# Patient Record
Sex: FEMALE | Race: WHITE | NOT HISPANIC OR LATINO | Employment: FULL TIME | URBAN - METROPOLITAN AREA
[De-identification: names, ages, dates, MRNs, and addresses within clinical notes are randomized per-mention and may not be internally consistent; named-entity substitution may affect disease eponyms.]

---

## 2017-10-23 ENCOUNTER — HOSPITAL ENCOUNTER (OUTPATIENT)
Facility: HOSPITAL | Age: 57
Setting detail: OBSERVATION
Discharge: HOME | End: 2017-10-25
Attending: EMERGENCY MEDICINE | Admitting: INTERNAL MEDICINE
Payer: COMMERCIAL

## 2017-10-23 ENCOUNTER — APPOINTMENT (EMERGENCY)
Dept: RADIOLOGY | Facility: HOSPITAL | Age: 57
End: 2017-10-23
Payer: COMMERCIAL

## 2017-10-23 DIAGNOSIS — R42 VERTIGO: ICD-10-CM

## 2017-10-23 LAB
ALBUMIN SERPL BCP-MCNC: 3.6 G/DL (ref 3.5–5)
ALP SERPL-CCNC: 84 U/L (ref 46–116)
ALT SERPL W P-5'-P-CCNC: 25 U/L (ref 12–78)
ANION GAP SERPL CALCULATED.3IONS-SCNC: 14 MMOL/L (ref 4–13)
AST SERPL W P-5'-P-CCNC: 33 U/L (ref 5–45)
ATRIAL RATE: 70 BPM
BASOPHILS # BLD AUTO: 0.11 THOUSAND/UL (ref 0–0.1)
BASOPHILS NFR MAR MANUAL: 1 % (ref 0–1)
BILIRUB SERPL-MCNC: 0.4 MG/DL (ref 0.2–1)
BUN SERPL-MCNC: 18 MG/DL (ref 5–25)
CALCIUM SERPL-MCNC: 9.3 MG/DL (ref 8.3–10.1)
CHLORIDE SERPL-SCNC: 103 MMOL/L (ref 100–108)
CO2 SERPL-SCNC: 24 MMOL/L (ref 21–32)
CREAT SERPL-MCNC: 0.92 MG/DL (ref 0.6–1.3)
EOSINOPHIL # BLD AUTO: 0.32 THOUSAND/UL (ref 0–0.61)
EOSINOPHIL NFR BLD MANUAL: 3 % (ref 0–6)
ERYTHROCYTE [DISTWIDTH] IN BLOOD BY AUTOMATED COUNT: 13.6 % (ref 11.6–15.1)
ETHANOL SERPL-MCNC: <3 MG/DL (ref 0–3)
GFR SERPL CREATININE-BSD FRML MDRD: 68 ML/MIN/1.73SQ M
GLUCOSE SERPL-MCNC: 131 MG/DL (ref 65–140)
HCT VFR BLD AUTO: 45.2 % (ref 37–47)
HGB BLD-MCNC: 14.7 G/DL (ref 12–16)
HOLD SPECIMEN: NORMAL
LIPASE SERPL-CCNC: 192 U/L (ref 73–393)
LYMPHOCYTES # BLD AUTO: 2.16 THOUSAND/UL (ref 0.6–4.47)
LYMPHOCYTES # BLD AUTO: 20 %
MCH RBC QN AUTO: 27.9 PG (ref 27–31)
MCHC RBC AUTO-ENTMCNC: 32.6 G/DL (ref 31.4–37.4)
MCV RBC AUTO: 86 FL (ref 82–98)
MONOCYTES # BLD AUTO: 0.76 THOUSAND/UL (ref 0–1.22)
MONOCYTES NFR BLD AUTO: 7 % (ref 4–12)
NEUTS BAND NFR BLD MANUAL: 0 % (ref 0–8)
NEUTS SEG # BLD: 7.45 THOUSAND/UL (ref 1.81–6.82)
NEUTS SEG NFR BLD AUTO: 69 %
P AXIS: 53 DEGREES
PLATELET # BLD AUTO: 330 THOUSANDS/UL (ref 130–400)
PLATELET BLD QL SMEAR: ADEQUATE
PMV BLD AUTO: 7.7 FL (ref 8.9–12.7)
POTASSIUM SERPL-SCNC: 4.6 MMOL/L (ref 3.5–5.3)
PR INTERVAL: 160 MS
PROT SERPL-MCNC: 8.1 G/DL (ref 6.4–8.2)
QRS AXIS: -28 DEGREES
QRSD INTERVAL: 106 MS
QT INTERVAL: 438 MS
QTC INTERVAL: 473 MS
RBC # BLD AUTO: 5.27 MILLION/UL (ref 4.2–5.4)
SODIUM SERPL-SCNC: 141 MMOL/L (ref 136–145)
T WAVE AXIS: 4 DEGREES
TOTAL CELLS COUNTED SPEC: 100
VENTRICULAR RATE: 70 BPM
WBC # BLD AUTO: 10.8 THOUSAND/UL (ref 4.8–10.8)

## 2017-10-23 PROCEDURE — 96376 TX/PRO/DX INJ SAME DRUG ADON: CPT

## 2017-10-23 PROCEDURE — 96374 THER/PROPH/DIAG INJ IV PUSH: CPT

## 2017-10-23 PROCEDURE — 87081 CULTURE SCREEN ONLY: CPT | Performed by: INTERNAL MEDICINE

## 2017-10-23 PROCEDURE — 85007 BL SMEAR W/DIFF WBC COUNT: CPT | Performed by: EMERGENCY MEDICINE

## 2017-10-23 PROCEDURE — 93005 ELECTROCARDIOGRAM TRACING: CPT | Performed by: EMERGENCY MEDICINE

## 2017-10-23 PROCEDURE — 36415 COLL VENOUS BLD VENIPUNCTURE: CPT | Performed by: EMERGENCY MEDICINE

## 2017-10-23 PROCEDURE — 83690 ASSAY OF LIPASE: CPT | Performed by: EMERGENCY MEDICINE

## 2017-10-23 PROCEDURE — 80053 COMPREHEN METABOLIC PANEL: CPT | Performed by: EMERGENCY MEDICINE

## 2017-10-23 PROCEDURE — 86617 LYME DISEASE ANTIBODY: CPT | Performed by: EMERGENCY MEDICINE

## 2017-10-23 PROCEDURE — 96375 TX/PRO/DX INJ NEW DRUG ADDON: CPT

## 2017-10-23 PROCEDURE — 99285 EMERGENCY DEPT VISIT HI MDM: CPT

## 2017-10-23 PROCEDURE — 70450 CT HEAD/BRAIN W/O DYE: CPT

## 2017-10-23 PROCEDURE — 96361 HYDRATE IV INFUSION ADD-ON: CPT

## 2017-10-23 PROCEDURE — 80320 DRUG SCREEN QUANTALCOHOLS: CPT | Performed by: EMERGENCY MEDICINE

## 2017-10-23 PROCEDURE — 85027 COMPLETE CBC AUTOMATED: CPT | Performed by: EMERGENCY MEDICINE

## 2017-10-23 RX ORDER — OMEGA-3-ACID ETHYL ESTERS 1 G/1
1 CAPSULE, LIQUID FILLED ORAL DAILY
COMMUNITY
End: 2018-12-05 | Stop reason: ALTCHOICE

## 2017-10-23 RX ORDER — ONDANSETRON 2 MG/ML
4 INJECTION INTRAMUSCULAR; INTRAVENOUS ONCE
Status: COMPLETED | OUTPATIENT
Start: 2017-10-23 | End: 2017-10-23

## 2017-10-23 RX ORDER — ASPIRIN 81 MG/1
81 TABLET ORAL DAILY
Status: DISCONTINUED | OUTPATIENT
Start: 2017-10-23 | End: 2017-10-25 | Stop reason: HOSPADM

## 2017-10-23 RX ORDER — CHLORAL HYDRATE 500 MG
1000 CAPSULE ORAL DAILY
Status: DISCONTINUED | OUTPATIENT
Start: 2017-10-23 | End: 2017-10-25 | Stop reason: HOSPADM

## 2017-10-23 RX ORDER — MECLIZINE HYDROCHLORIDE 25 MG/1
25 TABLET ORAL EVERY 8 HOURS SCHEDULED
Status: DISCONTINUED | OUTPATIENT
Start: 2017-10-23 | End: 2017-10-25 | Stop reason: HOSPADM

## 2017-10-23 RX ORDER — ASPIRIN 81 MG/1
81 TABLET ORAL DAILY
COMMUNITY
End: 2018-12-05 | Stop reason: ALTCHOICE

## 2017-10-23 RX ORDER — METOCLOPRAMIDE HYDROCHLORIDE 5 MG/ML
10 INJECTION INTRAMUSCULAR; INTRAVENOUS ONCE
Status: COMPLETED | OUTPATIENT
Start: 2017-10-23 | End: 2017-10-23

## 2017-10-23 RX ORDER — ONDANSETRON 2 MG/ML
4 INJECTION INTRAMUSCULAR; INTRAVENOUS EVERY 6 HOURS PRN
Status: DISCONTINUED | OUTPATIENT
Start: 2017-10-23 | End: 2017-10-25 | Stop reason: HOSPADM

## 2017-10-23 RX ORDER — ACETAMINOPHEN 325 MG/1
650 TABLET ORAL EVERY 6 HOURS PRN
Status: DISCONTINUED | OUTPATIENT
Start: 2017-10-23 | End: 2017-10-25 | Stop reason: HOSPADM

## 2017-10-23 RX ORDER — MECLIZINE HYDROCHLORIDE 25 MG/1
50 TABLET ORAL ONCE
Status: COMPLETED | OUTPATIENT
Start: 2017-10-23 | End: 2017-10-23

## 2017-10-23 RX ORDER — DIAZEPAM 5 MG/ML
2.5 INJECTION, SOLUTION INTRAMUSCULAR; INTRAVENOUS ONCE
Status: COMPLETED | OUTPATIENT
Start: 2017-10-23 | End: 2017-10-23

## 2017-10-23 RX ADMIN — SODIUM CHLORIDE 1000 ML: 0.9 INJECTION, SOLUTION INTRAVENOUS at 06:19

## 2017-10-23 RX ADMIN — MECLIZINE HYDROCHLORIDE 25 MG: 25 TABLET ORAL at 14:03

## 2017-10-23 RX ADMIN — ONDANSETRON 4 MG: 2 INJECTION INTRAMUSCULAR; INTRAVENOUS at 06:18

## 2017-10-23 RX ADMIN — METOCLOPRAMIDE 10 MG: 5 INJECTION, SOLUTION INTRAMUSCULAR; INTRAVENOUS at 08:44

## 2017-10-23 RX ADMIN — ONDANSETRON 4 MG: 2 INJECTION INTRAMUSCULAR; INTRAVENOUS at 08:05

## 2017-10-23 RX ADMIN — ACETAMINOPHEN 650 MG: 325 TABLET, FILM COATED ORAL at 22:37

## 2017-10-23 RX ADMIN — MECLIZINE HYDROCHLORIDE 50 MG: 25 TABLET ORAL at 08:38

## 2017-10-23 RX ADMIN — MECLIZINE HYDROCHLORIDE 25 MG: 25 TABLET ORAL at 21:55

## 2017-10-23 RX ADMIN — Medication 1000 MG: at 10:41

## 2017-10-23 RX ADMIN — ASPIRIN 81 MG: 81 TABLET, COATED ORAL at 10:41

## 2017-10-23 RX ADMIN — DIAZEPAM 2.5 MG: 5 INJECTION, SOLUTION INTRAMUSCULAR; INTRAVENOUS at 07:15

## 2017-10-23 RX ADMIN — ONDANSETRON 4 MG: 2 INJECTION INTRAMUSCULAR; INTRAVENOUS at 10:42

## 2017-10-23 NOTE — ED PROVIDER NOTES
History  Chief Complaint   Patient presents with    Dizziness     patient woke up this 0400 with dizziness and nausea, patient states can't lay down, did have one episode of vomiting this am, describes as room spinning this am    Nausea     78-year-old white female woke up this morning at 4 o'clock with vertigo  Patient unable to move head from side sided change positions without getting extremely nauseous and throwing up  No prior history of same  No recent URI, no recent tick bite, no recent travel at altitude, no hearing loss, no improvement with p o  Intake  No recent trauma  Dizziness   Quality:  Room spinning  Severity:  Severe  Onset quality:  Sudden  Duration:  2 hours  Timing:  Constant  Progression:  Worsening  Chronicity:  New  Context: bending over, head movement and standing up    Relieved by:  Nothing  Worsened by: Movement and turning head  Ineffective treatments:  Lying down, fluids, being still and closing eyes  Associated symptoms: nausea, vision changes and vomiting    Associated symptoms: no blood in stool, no chest pain, no diarrhea, no headaches, no hearing loss, no palpitations, no shortness of breath, no syncope, no tinnitus and no weakness    Risk factors: no hx of stroke, no hx of vertigo, no Meniere's disease and no new medications        Prior to Admission Medications   Prescriptions Last Dose Informant Patient Reported? Taking?   aspirin (ECOTRIN LOW STRENGTH) 81 mg EC tablet   Yes Yes   Sig: Take 81 mg by mouth daily   omega-3-acid ethyl esters (LOVAZA) 1 g capsule   Yes Yes   Sig: Take 2 g by mouth 2 (two) times a day      Facility-Administered Medications: None       Past Medical History:   Diagnosis Date    Blood clot in vein     in legs after fall       Past Surgical History:   Procedure Laterality Date    VEIN SURGERY         History reviewed  No pertinent family history  I have reviewed and agree with the history as documented      Social History   Substance Use Topics    Smoking status: Never Smoker    Smokeless tobacco: Never Used    Alcohol use Yes      Comment: rare        Review of Systems   Constitutional: Negative for chills and fever  HENT: Positive for congestion  Negative for hearing loss and tinnitus  Eyes: Positive for visual disturbance  Respiratory: Negative for shortness of breath  Cardiovascular: Negative for chest pain, palpitations and syncope  Gastrointestinal: Positive for nausea and vomiting  Negative for blood in stool and diarrhea  Genitourinary: Negative  Musculoskeletal: Negative  Negative for back pain  Skin: Negative  Neurological: Positive for dizziness  Negative for syncope, speech difficulty, weakness, numbness and headaches  Hematological: Negative  Psychiatric/Behavioral: Negative  All other systems reviewed and are negative  Physical Exam  ED Triage Vitals [10/23/17 0603]   Temperature Pulse Respirations Blood Pressure SpO2   (!) 95 9 °F (35 5 °C) 80 20 132/59 98 %      Temp Source Heart Rate Source Patient Position - Orthostatic VS BP Location FiO2 (%)   Tympanic Monitor Lying Right arm --      Pain Score       No Pain           Physical Exam   Constitutional: She is oriented to person, place, and time  She appears well-developed and well-nourished  HENT:   Head: Normocephalic and atraumatic  Right Ear: External ear normal    Left Ear: External ear normal    Nose: Nose normal    Mouth/Throat: Oropharynx is clear and moist    Eyes: Conjunctivae and lids are normal  Pupils are equal, round, and reactive to light  Lids are everted and swept, no foreign bodies found  Right eye exhibits nystagmus  Left eye exhibits nystagmus  Neck: Normal range of motion  Neck supple  Cardiovascular: Normal rate, regular rhythm, normal heart sounds and intact distal pulses  Pulmonary/Chest: Effort normal and breath sounds normal    Abdominal: Soft   Bowel sounds are normal    Musculoskeletal: Normal range of motion  Neurological: She is alert and oriented to person, place, and time  Skin: Skin is warm and dry  Capillary refill takes less than 2 seconds  Psychiatric: She has a normal mood and affect  Her behavior is normal  Judgment and thought content normal    Nursing note and vitals reviewed  ED Medications  Medications   sodium chloride 0 9 % bolus 1,000 mL (1,000 mL Intravenous New Bag 10/23/17 0619)   diazepam (VALIUM) injection 2 5 mg (not administered)   ondansetron (ZOFRAN) injection 4 mg (4 mg Intravenous Given 10/23/17 0618)       Diagnostic Studies  Labs Reviewed   CBC AND DIFFERENTIAL - Abnormal        Result Value Ref Range Status    MPV 7 7 (*) 8 9 - 12 7 fL Final    WBC 10 80  4 80 - 10 80 Thousand/uL Final    RBC 5 27  4 20 - 5 40 Million/uL Final    Hemoglobin 14 7  12 0 - 16 0 g/dL Final    Hematocrit 45 2  37 0 - 47 0 % Final    MCV 86  82 - 98 fL Final    MCH 27 9  27 0 - 31 0 pg Final    MCHC 32 6  31 4 - 37 4 g/dL Final    RDW 13 6  11 6 - 15 1 % Final    Platelets 251  645 - 400 Thousands/uL Final   COMPREHENSIVE METABOLIC PANEL - Abnormal     Anion Gap 14 (*) 4 - 13 mmol/L Final    Sodium 141  136 - 145 mmol/L Final    Potassium 4 6  3 5 - 5 3 mmol/L Final    Comment: Slightly Hemolyzed; Results May be Affected    Chloride 103  100 - 108 mmol/L Final    CO2 24  21 - 32 mmol/L Final    BUN 18  5 - 25 mg/dL Final    Creatinine 0 92  0 60 - 1 30 mg/dL Final    Comment: Standardized to IDMS reference method    Glucose 131  65 - 140 mg/dL Final    Comment:   If the patient is fasting, the ADA then defines impaired fasting glucose as > 100 mg/dL and diabetes as > or equal to 123 mg/dL  Specimen collection should occur prior to Sulfasalazine administration due to the potential for falsely depressed results  Specimen collection should occur prior to Sulfapyridine administration due to the potential for falsely elevated results      Calcium 9 3  8 3 - 10 1 mg/dL Final    AST 33  5 - 45 U/L Final    Comment: Slightly Hemolyzed; Results May be Affected  Specimen collection should occur prior to Sulfasalazine administration due to the potential for falsely depressed results  ALT 25  12 - 78 U/L Final    Comment:   Specimen collection should occur prior to Sulfasalazine administration due to the potential for falsely depressed results  Alkaline Phosphatase 84  46 - 116 U/L Final    Total Protein 8 1  6 4 - 8 2 g/dL Final    Albumin 3 6  3 5 - 5 0 g/dL Final    Total Bilirubin 0 40  0 20 - 1 00 mg/dL Final    eGFR 68  ml/min/1 73sq m Final    Narrative:     National Kidney Disease Education Program recommendations are as follows:  GFR calculation is accurate only with a steady state creatinine  Chronic Kidney disease less than 60 ml/min/1 73 sq  meters  Kidney failure less than 15 ml/min/1 73 sq  meters     MANUAL DIFF (NON WAM) - Abnormal     Neutrophils Absolute 7 45 (*) 1 81 - 6 82 Thousand/uL Final    Basophils Absolute 0 11 (*) 0 00 - 0 10 Thousand/uL Final    Segmented % 69  % Final    Bands % 0  0 - 8 % Final    Lymphocytes % 20  % Final    Monocytes % 7  4 - 12 % Final    Eosinophils % 3  0 - 6 % Final    Basophils % 1  0 - 1 % Final    Lymphocytes Absolute 2 16  0 60 - 4 47 Thousand/uL Final    Monocytes Absolute 0 76  0 00 - 1 22 Thousand/uL Final    Eosinophils Absolute 0 32  0 00 - 0 61 Thousand/uL Final    Total Counted 100   Final    Platelet Estimate Adequate  Adequate Final   LIPASE - Normal    Lipase 192  73 - 393 u/L Final   MEDICAL ALCOHOL   LYME DISEASE, WESTERN BLOT   LIGHT BLUE TOP   GREEN / BLACK TUBE ON HOLD       CT head without contrast    (Results Pending)       Procedures  ECG 12 Lead Documentation  Date/Time: 10/23/2017 6:20 AM  Performed by: Roseann Vitale  Authorized by: Endy VILLA     Indications / Diagnosis:  70  ECG reviewed by me, the ED Provider: yes    Patient location:  ED  Interpretation:     Interpretation: normal    Rate:     ECG rate:  70    ECG rate assessment: normal    Rhythm:     Rhythm: sinus rhythm    Ectopy:     Ectopy: none    QRS:     QRS axis:  Normal    QRS intervals:  Normal  Conduction:     Conduction: normal    ST segments:     ST segments:  Normal  T waves:     T waves: normal            Phone Contacts  ED Phone Contact    ED Course  ED Course                                [facility]  CritCare Time    Disposition  Final diagnoses:   Vertigo     ED Disposition     None      Follow-up Information     Follow up With Specialties Details Why Contact Info    [employer]  In 1 week  [phone]          Patient's Medications   Discharge Prescriptions    No medications on file     No discharge procedures on file      ED Provider  Electronically Signed by       Bill Kay MD  10/23/17 8128

## 2017-10-23 NOTE — ED CARE HANDOFF
8:35 AM  This is a 38-year-old female with noted new onset of severe vertigo  The patient had a CT scan as well as blood work performed in the emergency department centrally unremarkable  She is unrelenting in her symptoms and did not improved with medications  Plan will be for inpatient management and evaluation    Spoke with hospitalist agree for inpatient management and eval

## 2017-10-23 NOTE — ED NOTES
Attempted to sit pt up, but instantly became dizzy and did not tolerate it  Dr Brittanie Murguia made aware       Janessa Ayala, TL  10/23/17 8749       Janessa Ayala RN  10/23/17 7566

## 2017-10-23 NOTE — DISCHARGE INSTRUCTIONS
Dizziness, Ambulatory Care   GENERAL INFORMATION:   Dizziness  is a term used to describe a feeling of being off balance or unsteady  Common causes of dizziness are an inner ear fluid imbalance or a lack of oxygen in your blood  Your dizziness may be acute (lasts 3 days or less) or chronic (lasts longer than 3 days)  You may have dizzy spells that last from seconds to a few hours  Common symptoms related to dizziness include the following:   · A feeling that your surroundings are moving even though you are standing still    · Ringing in your ears or hearing loss     · Feeling faint or lightheaded     · Weakness or unsteadiness     · Double vision or eye movements you cannot control    · Nausea or vomiting     · Confusion  Seek immediate care for the following symptoms:   · You have a headache that does not go away with medicine  · You have shaking chills and a fever  · You vomit over and over with no relief  · Your vomit or bowel movements are red or black  · You have pain in your chest, back, or abdomen  · You have numbness, especially in your face, arms, or legs  · You have trouble moving your arms or legs  Treatment for dizziness  depends on the cause of your dizziness  You may need medicines to decrease your dizziness or nausea  You may need to be admitted to the hospital for treatment  Manage your symptoms:  Get up slowly from sitting or lying down  Do not drive or operate machinery when you are dizzy  Follow up with your healthcare provider as directed:  Write down your questions so you remember to ask them during your visits  CARE AGREEMENT:   You have the right to help plan your care  Learn about your health condition and how it may be treated  Discuss treatment options with your caregivers to decide what care you want to receive  You always have the right to refuse treatment  The above information is an  only   It is not intended as medical advice for individual conditions or treatments  Talk to your doctor, nurse or pharmacist before following any medical regimen to see if it is safe and effective for you  © 2014 [address] is for End User's use only and may not be sold, redistributed or otherwise used for commercial purposes  All illustrations and images included in CareNotes® are the copyrighted property of [employer]  or [employer]  [facility], [facility] Care   GENERAL INFORMATION:   Vertigo  is when you feel dizzy  You may think that you or your surroundings are spinning even though you are not moving  Vertigo may be caused by problems that affect your inner ear or the part of your brain that controls balance  Common symptoms include the following: You may feel that you or everything around you is moving or spinning  You may also feel like you are being pulled down or toward your side  Symptoms may occur after you change positions, such as when you turn over in bed or move your head or neck  You may have nausea or vomiting  You may also have trouble with your balance, which may cause you to fall  Seek immediate care for the following symptoms:   · Blood, pus, or fluid is coming out of your ears  · Any of the following signs of a stroke:     ¨ Numbness or drooping on one side of your face     ¨ Weakness in an arm or leg    ¨ Confusion or difficulty speaking    ¨ Dizziness, a severe headache, or vision loss  Treatment for vertigo  depends on the cause  You may need medicines to treat your vertigo  You may also need medicine to treat symptoms caused by vertigo, such as nausea or headaches  You may need surgery to treat the cause of your vertigo  Manage your symptoms:   · Do not walk without help, drive a car, or operate heavy machinery if you feel dizzy  · Move slowly and let yourself get used to one position before you move to another position  · Sit or lie down right away if you feel dizzy    Balance therapy: You may need to see a therapist to teach you certain exercises  These exercises will help your brain adapt to the dizziness  This will help decrease your vertigo and improve your balance  Ask for more information about this therapy  Follow up with your healthcare provider as directed:  Write down your questions so you remember to ask them during your visits  CARE AGREEMENT:   You have the right to help plan your care  Learn about your health condition and how it may be treated  Discuss treatment options with your caregivers to decide what care you want to receive  You always have the right to refuse treatment  The above information is an  only  It is not intended as medical advice for individual conditions or treatments  Talk to your doctor, nurse or pharmacist before following any medical regimen to see if it is safe and effective for you  © 2014 [address] is for End User's use only and may not be sold, redistributed or otherwise used for commercial purposes  All illustrations and images included in CareNotes® are the copyrighted property of [employer] Inc  or [employer]

## 2017-10-23 NOTE — H&P
History and Physical - [facility] Internal Medicine    Patient Information: Jero Colby 59 y o  female MRN: [id number]  Unit/Bed#: [address] [address] Encounter: [id number]  Admitting Physician: Danielle Walls MD  PCP: No primary care provider on file  Date of Admission:  10/23/17        Hospital Problem List:     Principal Problem:    Vertigo      Assessment/Plan:      1  Vertigo-likely benign positional vertigo  The patient will be observation over night  Symptomatic treatment with Zofran and meclizine 25 milligram p o  t i d   CT scan of the brain was normal   If patient does not improve over the next 24 hours we will consider Neurology evaluation  2  Dyslipidemia-continue Lovaza  3  Varicose veins status post vein stripping    VTE Prophylaxis: Low risk  / sequential compression device   Code Status: No Order    Anticipated Length of Stay:  Patient will be admitted on an Observation basis with an anticipated length of stay of  Less than 2 midnights  Justification for Hospital Stay: Vertigo    Total Time for Visit, including Counseling / Coordination of Care: 1 hour  Greater than 50% of this total time spent on direct patient counseling and coordination of care  Chief Complaint:     Dizziness (patient woke up this 0400 with dizziness and nausea, patient states can't lay down, did have one episode of vomiting this am, describes as room spinning this am) and Nausea      History of Present Illness:    Jero Colby is a 59 y o  female with past medical history of dyslipidemia present to the emergency room complaining of spinning sensation of lightheadedness with nausea and vomiting since this morning  Patient reports that she woke up this morning and started feeling the room spinning sensation followed by nausea  Patient had 3 episodes of vomiting since this morning  Patient denies any earache, headache, chest pain, palpitations, shortness of breath, abdominal pain   Patient reports the spinning sensation is worse with movement of the head and changing positions  Patient denies any weakness, tingling numbness any part of her body  In the ED, patient's vital signs were stable and CT scan of the brain was negative  Patient received diazepam, meclizine, Reglan and Zofran with continued symptoms  Review of Systems:    Review of Systems   Constitutional: Negative for activity change, appetite change, chills, diaphoresis, fatigue, fever and unexpected weight change  HENT: Negative for congestion, ear discharge, ear pain, facial swelling, hearing loss, mouth sores, nosebleeds, postnasal drip, rhinorrhea, sinus pressure, sneezing, sore throat, tinnitus, trouble swallowing and voice change  Eyes: Negative for photophobia, discharge, redness and visual disturbance  Respiratory: Negative for cough, chest tightness, shortness of breath, wheezing and stridor  Cardiovascular: Negative for chest pain, palpitations and leg swelling  Gastrointestinal: Positive for nausea and vomiting  Negative for abdominal distention, abdominal pain, anal bleeding, blood in stool, constipation and diarrhea  Endocrine: Negative for polydipsia, polyphagia and polyuria  Genitourinary: Negative for decreased urine volume, difficulty urinating, dysuria, flank pain, hematuria, menstrual problem, pelvic pain, urgency, vaginal bleeding and vaginal discharge  Musculoskeletal: Negative for arthralgias, back pain and neck stiffness  Skin: Negative for pallor and rash  Neurological: Positive for dizziness  Negative for seizures, facial asymmetry, speech difficulty, light-headedness, numbness and headaches  Spinning sensation   Hematological: Negative for adenopathy  Psychiatric/Behavioral: Negative for agitation and confusion         Past Medical and Surgical History:     Past Medical History:   Diagnosis Date    Varicose vein of leg        Past Surgical History:   Procedure Laterality Date    VEIN SURGERY vein stripping       Meds/Allergies:    PTA meds:   Prior to Admission Medications   Prescriptions Last Dose Informant Patient Reported? Taking?   aspirin (ECOTRIN LOW STRENGTH) 81 mg EC tablet 10/23/2017 at Unknown time  Yes Yes   Sig: Take 81 mg by mouth daily   omega-3-acid ethyl esters (LOVAZA) 1 g capsule 10/22/2017 at Unknown time  Yes Yes   Sig: Take 2 g by mouth 2 (two) times a day      Facility-Administered Medications: None       Allergies: Allergies   Allergen Reactions    Augmentin [Amoxicillin-Pot Clavulanate] GI Intolerance    Penicillins Rash       Social History:     Marital Status: /Civil Union     Substance Use History:   History   Alcohol Use    Yes     Comment: rare     History   Smoking Status    Never Smoker   Smokeless Tobacco    Never Used     History   Drug use: Unknown       Family History:    Family History   Problem Relation Age of Onset    Heart attack Mother     Transient ischemic attack Mother     Diabetes Father        Physical Exam:     Vitals:   Blood Pressure: 122/61 (10/23/17 0937)  Pulse: 78 (10/23/17 0937)  Temperature: (!) 96 9 °F (36 1 °C) (10/23/17 0937)  Temp Source: Tympanic (10/23/17 0937)  Respirations: 18 (10/23/17 0937)  Height: 5' 7 5" (171 5 cm) (10/23/17 8389)  Weight - Scale: 81 kg (178 lb 9 2 oz) (10/23/17 0937)  SpO2: 98 % (10/23/17 0937)    Physical Exam   Constitutional: No distress  HENT:   Head: Normocephalic and atraumatic  Nose: Nose normal    Positive nystagmus   Eyes: Conjunctivae and EOM are normal  Pupils are equal, round, and reactive to light  Neck: Normal range of motion  Neck supple  No JVD present  Cardiovascular: Normal rate, regular rhythm and normal heart sounds  Exam reveals no gallop and no friction rub  No murmur heard  Pulmonary/Chest: Effort normal and breath sounds normal  No respiratory distress  She has no wheezes  She has no rales  She exhibits no tenderness  Abdominal: Soft   Bowel sounds are normal  She exhibits no distension  There is no tenderness  There is no rebound and no guarding  Musculoskeletal: She exhibits no edema  Neurological: She is alert  No cranial nerve deficit  Skin: Skin is warm and dry  No rash noted  Psychiatric: She has a normal mood and affect  Lab Results: I have personally reviewed pertinent films in PACS      Results from last 7 days  Lab Units 10/23/17  0619   WBC Thousand/uL 10 80   HEMOGLOBIN g/dL 14 7   HEMATOCRIT % 45 2   PLATELETS Thousands/uL 330   LYMPHO PCT % 20   MONO PCT MAN % 7   EOSINO PCT MANUAL % 3       Results from last 7 days  Lab Units 10/23/17  0619   SODIUM mmol/L 141   POTASSIUM mmol/L 4 6   CHLORIDE mmol/L 103   CO2 mmol/L 24   BUN mg/dL 18   CREATININE mg/dL 0 92   CALCIUM mg/dL 9 3   TOTAL PROTEIN g/dL 8 1   BILIRUBIN TOTAL mg/dL 0 40   ALK PHOS U/L 84   ALT U/L 25   AST U/L 33   GLUCOSE RANDOM mg/dL 131           Imaging: I have personally reviewed pertinent films in PACS    Ct Head Without Contrast    Result Date: 10/23/2017  Narrative: CT BRAIN - WITHOUT CONTRAST INDICATION:  Vertigo  COMPARISON:  None  TECHNIQUE:  CT examination of the brain was performed  In addition to axial images, coronal reformatted images were created and submitted for interpretation  Radiation dose length product (DLP) for this visit:  1060 68 mGy-cm   This examination, like all CT scans performed in the [facility], was performed utilizing techniques to minimize radiation dose exposure, including the use of iterative reconstruction and automated exposure control  IMAGE QUALITY:  Diagnostic  FINDINGS:  PARENCHYMA:  No intracranial mass, mass effect or midline shift  No CT signs of acute infarction  There is no parenchymal hemorrhage  VENTRICLES AND EXTRA-AXIAL SPACES:  Normal for patient's age  VISUALIZED ORBITS AND PARANASAL SINUSES:  Unremarkable   CALVARIUM AND EXTRACRANIAL SOFT TISSUES:   Normal      Impression: No acute intracranial abnormality  Workstation performed: [id number]       CT head without contrast   Final Result      No acute intracranial abnormality  Workstation performed: [id number]             EKG, Pathology, and Other Studies Reviewed on Admission:   · EKG shows normal sinus rhythm without any acute ST-T changes  Allscripts Records Reviewed: Yes     ** Please Note: Dragon 360 Dictation voice to text software may have been used in the creation of this document   **

## 2017-10-24 LAB — MRSA NOSE QL CULT: NORMAL

## 2017-10-24 RX ADMIN — MECLIZINE HYDROCHLORIDE 25 MG: 25 TABLET ORAL at 06:14

## 2017-10-24 RX ADMIN — Medication 1000 MG: at 08:45

## 2017-10-24 RX ADMIN — ACETAMINOPHEN 650 MG: 325 TABLET, FILM COATED ORAL at 08:45

## 2017-10-24 RX ADMIN — MECLIZINE HYDROCHLORIDE 25 MG: 25 TABLET ORAL at 15:01

## 2017-10-24 RX ADMIN — MECLIZINE HYDROCHLORIDE 25 MG: 25 TABLET ORAL at 21:19

## 2017-10-24 RX ADMIN — ASPIRIN 81 MG: 81 TABLET, COATED ORAL at 08:45

## 2017-10-24 NOTE — CASE MANAGEMENT
Initial Clinical Review    Admission: Date/Time/Statement: 10/23/17 0602    Orders Placed This Encounter   Procedures    Place in Observation (expected length of stay for this patient is less than two midnights)     Standing Status:   Standing     Number of Occurrences:   1     Order Specific Question:   Admitting Physician     Answer:   Margarito Mckeon     Order Specific Question:   Level of Care     Answer:   Med Surg [16]         ED: Date/Time/Mode of Arrival:   ED Arrival Information     Expected Arrival Acuity Means of Arrival Escorted By Service Admission Type    - 10/23/2017 05:54 Urgent Wheelchair Family Member General Medicine Urgent    Arrival Complaint    NAUSEA, DIZZINESS          Chief Complaint:   Chief Complaint   Patient presents with    Dizziness     patient woke up this 0400 with dizziness and nausea, patient states can't lay down, did have one episode of vomiting this am, describes as room spinning this am    Nausea       History of Illness: 59 y o  female with past medical history of dyslipidemia present to the emergency room complaining of spinning sensation of lightheadedness with nausea and vomiting since this morning  Patient reports that she woke up this morning and started feeling the room spinning sensation followed by nausea  Patient had 3 episodes of vomiting since this morning  Patient denies any earache, headache, chest pain, palpitations, shortness of breath, abdominal pain  Patient reports the spinning sensation is worse with movement of the head and changing positions  Patient denies any weakness, tingling numbness any part of her body  In the ED, patient's vital signs were stable and CT scan of the brain was negative  Patient received diazepam, meclizine, Reglan and Zofran with continued symptoms      ED Vital Signs:   ED Triage Vitals [10/23/17 0603]   Temperature Pulse Respirations Blood Pressure SpO2   (!) 95 9 °F (35 5 °C) 80 20 132/59 98 %      Temp Source Heart Rate Source Patient Position - Orthostatic VS BP Location FiO2 (%)   Tympanic Monitor Lying Right arm --      Pain Score       No Pain        Wt Readings from Last 1 Encounters:   10/23/17 81 kg (178 lb 9 2 oz)       Vital Signs (abnormal): Abnormal Labs/Diagnostic Test Results:   Ct head  No acute intracranial abnormality  ED Treatment:   Medication Administration from 10/23/2017 0554 to 10/23/2017 0914       Date/Time Order Dose Route Action Action by Comments     10/23/2017 0618 ondansetron (ZOFRAN) injection 4 mg 4 mg Intravenous Given Jeff Godinez RN      10/23/2017 9783 sodium chloride 0 9 % bolus 1,000 mL 0 mL Intravenous Stopped Janessa Ayala RN      10/23/2017 7313 sodium chloride 0 9 % bolus 1,000 mL 1,000 mL Intravenous Rikaet 37 Jeff Godinez RN      10/23/2017 0715 diazepam (VALIUM) injection 2 5 mg 2 5 mg Intravenous Given Kylee Canales RN      10/23/2017 0805 ondansetron (ZOFRAN) injection 4 mg 4 mg Intravenous Given Janessa Ayala RN      10/23/2017 9843 meclizine (ANTIVERT) tablet 50 mg 50 mg Oral Given Janessa Ayala RN      10/23/2017 0844 metoclopramide (REGLAN) injection 10 mg 10 mg Intravenous Given Janessa Ayala RN           Past Medical/Surgical History: Active Ambulatory Problems     Diagnosis Date Noted    No Active Ambulatory Problems     Resolved Ambulatory Problems     Diagnosis Date Noted    No Resolved Ambulatory Problems     Past Medical History:   Diagnosis Date    Varicose vein of leg        Admitting Diagnosis: Dizziness [R42]  Nausea [R11 0]  Vertigo [R42]    Age/Sex: 59 y o  female    Assessment/Plan:   Principal Problem:    Vertigo  Assessment/Plan:  1  Vertigo-likely benign positional vertigo  The patient will be observation over night    Symptomatic treatment with Zofran and meclizine 25 milligram p o  t i d   CT scan of the brain was normal   If patient does not improve over the next 24 hours we will consider Neurology evaluation  2  Dyslipidemia-continue Lovaza  3  Varicose veins status post vein stripping  VTE Prophylaxis: Low risk  / sequential compression device   Code Status: No Order  Anticipated Length of Stay:  Patient will be admitted on an Observation basis with an anticipated length of stay of  Less than 2 midnights     Justification for Hospital Stay: Vertigo    Admission Orders:  Tele mon  Scheduled Meds:   aspirin 81 mg Oral Daily   fish oil 1,000 mg Oral Daily   meclizine 25 mg Oral Q8H [facility]     Continuous Infusions:    PRN Meds:   acetaminophen    ondansetron

## 2017-10-24 NOTE — SOCIAL WORK
Met with pt  Resides with  Kerry Geiger in their home  Uses no dme  NO hhc  Has been independent, drives, and is employed  Explained role of cm, no d/c needs anticipated  CM reviewed d/c planning process including the following: identifying help at home, patient preference for d/c planning needs, and availability of the treatment team to discuss questions or concerns patient and/or family may have regarding understanding of medications and recognizing signs and symptoms once discharged  CM also encouraged patient to follow up with all recommended appointments after discharge  Patient advised of importance for patient and family to participate in managing patient's medical well being

## 2017-10-24 NOTE — PLAN OF CARE
Problem: PAIN - ADULT  Goal: Verbalizes/displays adequate comfort level or baseline comfort level  Interventions:  - Encourage patient to monitor pain and request assistance  - Assess pain using appropriate pain scale  - Administer analgesics based on type and severity of pain and evaluate response  - Implement non-pharmacological measures as appropriate and evaluate response  - Consider cultural and social influences on pain and pain management  - Notify physician/advanced practitioner if interventions unsuccessful or patient reports new pain   Outcome: Progressing      Problem: SAFETY ADULT  Goal: Patient will remain free of falls  INTERVENTIONS:  - Assess patient frequently for physical needs  -  Identify  physical deficits and behaviors that affect risk of falls    -  [place] fall precautions as indicated by assessment   - Educate patient/family on patient safety including physical limitations  - Instruct patient to call for assistance with activity based on assessment  - Modify environment to reduce risk of injury  - Consider OT/PT consult to assist with strengthening/mobility    Outcome: Progressing    Goal: Maintain or return to baseline ADL function  INTERVENTIONS:  -  Assess patient's ability to carry out ADLs; assess patient's baseline for ADL function and identify physical deficits which impact ability to perform ADLs (bathing, care of mouth/teeth, toileting, grooming, dressing, etc )  - Assess/evaluate cause of self-care deficits   - Assess range of motion  - Assess patient's mobility; develop plan if impaired  - Assess patient's need for assistive devices and provide as appropriate  - Encourage maximum independence but intervene and supervise when necessary  ¯ Involve family in performance of ADLs  ¯ Assess for home care needs following discharge   ¯ Request OT consult to assist with ADL evaluation and planning for discharge  ¯ Provide patient education as appropriate   Outcome: Progressing    Goal: Maintain or return mobility status to optimal level  INTERVENTIONS:  - Assess patient's baseline mobility status (ambulation, transfers, stairs, etc )    - Identify  physical deficits and behaviors that affect mobility  - [place] fall precautions as indicated by assessment  - Record patient progress and toleration of activity level on Mobility SBAR; progress patient to next Phase/Stage  - Instruct patient to call for assistance with activity based on assessment     Outcome: Progressing      Problem: DISCHARGE PLANNING  Goal: Discharge to home or other facility with appropriate resources  INTERVENTIONS:  - Identify barriers to discharge w/patient and caregiver  - Arrange for needed discharge resources and transportation as appropriate  - Identify discharge learning needs (meds, wound care, etc )  - Arrange for interpretive services to assist at discharge as needed  - Refer to Case Management Department for coordinating discharge planning if the patient needs post-hospital services based on physician/advanced practitioner order or complex needs related to functional status, cognitive ability, or social support system   Outcome: Progressing      Problem: Knowledge Deficit  Goal: Patient/family/caregiver demonstrates understanding of disease process, treatment plan, medications, and discharge instructions  Complete learning assessment and assess knowledge base  Interventions:  - Provide teaching at level of understanding  - Provide teaching via preferred learning methods   Outcome: Progressing      Problem: NEUROSENSORY - ADULT  Goal: Achieves stable or improved neurological status  INTERVENTIONS  - Monitor and report changes in neurological status  - Monitor temperature, glucose, and sodium or any other associated labs   Initiate appropriate interventions as ordered   - Administer anti-vertigo medication as ordered   Outcome: Progressing      Problem: GASTROINTESTINAL - ADULT  Goal: Minimal or absence of nausea and/or vomiting  INTERVENTIONS:  - Administer IV fluids as ordered to ensure adequate hydration  - Maintain NPO status until nausea and vomiting are resolved  - Administer ordered antiemetic medications as needed  - Provide nonpharmacologic comfort measures as appropriate  - Advance diet as tolerated, if ordered     Outcome: Progressing

## 2017-10-24 NOTE — PLAN OF CARE
Problem: DISCHARGE PLANNING - CARE MANAGEMENT  Goal: Discharge to post-acute care or home with appropriate resources  INTERVENTIONS:  - Conduct assessment to determine patient/family and health care team treatment goals, and need for post-acute services based on payer coverage, community resources, and patient preferences, and barriers to discharge  - Address psychosocial, clinical, and financial barriers to discharge as identified in assessment in conjunction with the patient/family and health care team  - Arrange appropriate level of post-acute services according to patient's   needs and preference and payer coverage in collaboration with the physician and health care team  - Communicate with and update the patient/family, physician, and health care team regarding progress on the discharge plan  - Arrange appropriate transportation to post-acute venues  Outcome: Adequate for Discharge  Plan:  Discharge to home with no needs

## 2017-10-24 NOTE — CONSULTS
[facility]   Neurology Initial Consult    Boogie Ames is a 59 y o  female  [facility]          Information obtained from:   Chief Complaint   Patient presents with    Dizziness     patient woke up this 0400 with dizziness and nausea, patient states can't lay down, did have one episode of vomiting this am, describes as room spinning this am    Nausea         Assessment/Plan:    1  Benign positional vertigo  2  HLD    Patient's clinical history and exam are suggestive of peripheral etiology of her vertigo  Discussed few exercises and movements to avoid  Cont prn meclizine and provide script upon discharge  Refer her to balance center upon discharge for canalith repositioning  Discussed prognosis  HPI:  Boogie Ames is a 58 yo F with no significant PMH presents with dizziness  She states that yesterday morning she woke up and had sudden onset of room spinning sensation  She became nauseated later and vomited  She said movement, sitting up, turning head would make her very dizzy and nauseous  She denies associated headache, tinnitus  She does admit that prior to the onset yesterday morning, she was sleeping with her head tilted back on pillow  She thinks she's noticed left ear hearing impairment for past few months  Denies recent infection, sinus disease  Denies difficulty with balance, paresthesia or weakness  Denies associated vision or speech disturbance  She denies similar episode in past  She has been getting meclizine and valium prn and says it has been helping  Today she feels much better compared to yesterday            Past Medical History:   Diagnosis Date    Varicose vein of leg        Past Surgical History:   Procedure Laterality Date    VEIN SURGERY      vein stripping       Allergies   Allergen Reactions    Augmentin [Amoxicillin-Pot Clavulanate] GI Intolerance    Penicillins Rash         Current Facility-Administered Medications:    acetaminophen (TYLENOL) tablet 650 mg, 650 mg, Oral, Q6H PRN, Oh Cook MD, 650 mg at 10/24/17 0845    aspirin (ECOTRIN LOW STRENGTH) EC tablet 81 mg, 81 mg, Oral, Daily, Arley Rosas MD, 81 mg at 10/24/17 0845    fish oil capsule 1,000 mg, 1,000 mg, Oral, Daily, Oh Cook MD, 1,000 mg at 10/24/17 0845    meclizine (ANTIVERT) tablet 25 mg, 25 mg, Oral, Q8H [address], Acevedo Race MD Alison, 25 mg at 10/24/17 1501    ondansetron (ZOFRAN) injection 4 mg, 4 mg, Intravenous, Q6H PRN, Oh Cook MD, 4 mg at 10/23/17 1042    Social History     Social History    Marital status: /Civil Union     Spouse name: N/A    Number of children: N/A    Years of education: N/A     Occupational History    Not on file  Social History Main Topics    Smoking status: Never Smoker    Smokeless tobacco: Never Used    Alcohol use Yes      Comment: rare    Drug use: Unknown    Sexual activity: Not on file     Other Topics Concern    Not on file     Social History Narrative    No narrative on file       Family History   Problem Relation Age of Onset    Heart attack Mother     Transient ischemic attack Mother     Diabetes Father          Review of systems:  Please see HPI for positive symptoms  No fever, no chills, no weight change  Ocular: No drainage, no blurred vision  HEENT:  No sore throat, earache, or congestion  No neck pain  COR:  No chest pain  No palpitations  Lungs:  no sob, wheezing,  GI:  +/- nausea, no vomiting, no diarrhea, no constipation, no anorexia  :  No dysuria, frequency, or urgency  No hematuria  Musculoskeletal:  No joint pain or swelling or edema  Skin:  No rash or itching  Psychiatric:  no anxiety, no depression  Endocrine:  No polyuria or polydipsia  Physical examination:  Vitals:    10/24/17 1606   BP: 104/57   Pulse: 73   Resp: 16   Temp: 98 5 °F (36 9 °C)   SpO2: 95%       GENERAL APPEARANCE:  The patient is alert, oriented    He is in no acute distress  HEENT:  Head is normocephalic  The sinuses are otherwise nontender  Pupils are equal and reactive  NECK:  Supple without lymphadenopathy  HEART:  Regular rate and rhythm  LUNGS:  clear to auscultation  No crackles or wheezes are heard  ABDOMEN:  Soft, nontender, nondistended with good bowel sounds heard  EXTREMITIES:  Without cyanosis, clubbing or edema  Mental status: The patient is alert, attentive, and oriented  Speech is clear and fluent, good repetition, comprehension, and naming  she recalls 3/3 objects at 5 minutes  Cranial nerves:  CN II: Visual fields are full to confrontation  Fundoscopic exam is normal with sharp discs and no vascular changes    Pupils are 3 mm and  reactive to light  CN III, IV, VI: At primary gaze, there is no eye deviation  CN V: Facial sensation is intact to pinprick in all 3 divisions bilaterally  Corneal responses are intact  CN VII: Face is symmetric with normal eye closure and smile  CN VIII: Hearing is normal to rubbing fingers  CN IX, X: Palate elevates symmetrically  Phonation is normal   CN XI: Head turning and shoulder shrug are intact  CN XII: Tongue is midline with normal movements and no atrophy  Motor: There is no pronator drift of out-stretched arms  Muscle bulk and tone are normal      Muscle exam  Arm Right Left Leg Right Left   Deltoid 5/5 5/5 Iliopsoas 5/5 5/5   Biceps 5/5 5/5 Quads 5/5 5/5   Triceps 5/5 5/5 Hamstrings 5/5 5/5   Wrist Extension 5/5 5/5 Ankle Dorsi Flexion 5/5 5/5   Wrist Flexion 5/5 5/5 Ankle Plantar Flexion 5/5 5/5   Interossei 5/5 5/5 Ankle Eversion 5/5 5/5   APB 5/5 5/5 Ankle Inversion 5/5 5/5       Reflexes   RJ BJ TJ KJ AJ Plantars [place]'s   Right 2+ 2+ 2+ 2+ 2+ Downgoing Not present   Left 2+ 2+ 2+ 2+ 2+ Downgoing Not present     Sensory:  Light touch, pinprick, position sense, and vibration sense are intact in fingers and toes    Coordination:  Rapid alternating movements and fine finger movements are intact  There is no dysmetria on finger-to-nose and heel-knee-shin  There are no abnormal or extraneous movements  Romberg negative  Gait/Stance:  Deferred due to dizziness     Lab Results   Component Value Date    WBC 10 80 10/23/2017    HGB 14 7 10/23/2017    HCT 45 2 10/23/2017    MCV 86 10/23/2017     10/23/2017     No results found for: HGBA1C  Lab Results   Component Value Date    ALT 25 10/23/2017    AST 33 10/23/2017    ALKPHOS 84 10/23/2017    BILITOT 0 40 10/23/2017     Lab Results   Component Value Date    GLUCOSE 131 10/23/2017    CALCIUM 9 3 10/23/2017     10/23/2017    K 4 6 10/23/2017    CO2 24 10/23/2017     10/23/2017    BUN 18 10/23/2017    CREATININE 0 92 10/23/2017         Radiology          Review of reports and notes reveal:    Independent Interpretation of images or specimens:  Ct Head Without Contrast    Result Date: 10/23/2017  No acute intracranial abnormality  Thank you for this consult  Total time of encounter: 60 min   More than 50% of time was spent in counseling and coordination of care of patient  WENCESLAO [facility]  [facility] [address]

## 2017-10-24 NOTE — PLAN OF CARE
DISCHARGE PLANNING     Discharge to home or other facility with appropriate resources Progressing        GASTROINTESTINAL - ADULT     Minimal or absence of nausea and/or vomiting Progressing        Knowledge Deficit     Patient/family/caregiver demonstrates understanding of disease process, treatment plan, medications, and discharge instructions Progressing        NEUROSENSORY - ADULT     Achieves stable or improved neurological status Progressing        PAIN - ADULT     Verbalizes/displays adequate comfort level or baseline comfort level Progressing        SAFETY ADULT     Patient will remain free of falls Progressing     Maintain or return to baseline ADL function Progressing     Maintain or return mobility status to optimal level Progressing        Plan of care discussed with Pt and Pt is progressing

## 2017-10-24 NOTE — PROGRESS NOTES
[facility] Progress Note  Patient: Nam Laureano 59 y o  female   MRN: [id number]  PCP: No primary care provider on file  Unit/Bed#: [address] [place] [address] Encounter: [id number]  Date Of Visit: 10/24/17    Problem List:    Principal Problem:    Vertigo      Assessment & Plan:    1  Vertigo - most likely benign paroxysmal positional vertigo  Continue symptomatic treatment with meclizine  Patient seen by Neurology and no new medications added  Informed patient that her symptoms will improve with time  Patient also advised to follow up with balance Center after discharge for canalith repositioning  2  Dyslipidemia - continue fish oil  3  Varicose veins s/p vein stripping      VTE Pharmacologic Prophylaxis:   Pharmacologic: Pharmacologic VTE Prophylaxis contraindicated due to low risk  Mechanical VTE Prophylaxis in Place: Yes    Patient Centered Rounds: I have performed bedside rounds with nursing staff today  Discussions with Specialists or Other Care Team Provider: Yes    Education and Discussions with Family / Patient:Yes    Time Spent for Care: 30 minutes  More than 50% of total time spent on counseling and coordination of care as described above  Current Length of Stay: 0 day(s)    Current Patient Status: Observation     Discharge Plan: home    Code Status: Level 1 - Full Code      Subjective:   Still with complaints of vertigo with movements although improved compared to yesterday  Objective:     Vitals:   Temp (24hrs), Av 5 °F (36 9 °C), Min:98 1 °F (36 7 °C), Max:99 °F (37 2 °C)    HR:  [67-73] 73  Resp:  [16-18] 16  BP: (104-123)/(54-66) 104/57  SpO2:  [94 %-95 %] 95 %  Body mass index is 27 56 kg/m²  Input and Output Summary (last 24 hours):     No intake or output data in the 24 hours ending 10/24/17 1850    Physical Exam:     Physical Exam   Constitutional: She is oriented to person, place, and time  She appears well-developed and well-nourished  No distress     HENT: Head: Normocephalic and atraumatic  Eyes: EOM are normal  Right eye exhibits no discharge  Left eye exhibits no discharge  No scleral icterus  Neck: Neck supple  No tracheal deviation present  Cardiovascular: Normal rate and regular rhythm  Pulmonary/Chest: Effort normal and breath sounds normal  No respiratory distress  She has no wheezes  She has no rales  Abdominal: Soft  Bowel sounds are normal  She exhibits no distension  There is no tenderness  Musculoskeletal: She exhibits no edema  Neurological: She is alert and oriented to person, place, and time  No cranial nerve deficit  She exhibits normal muscle tone  Patient reported dizziness when asked to sit up in bed   Skin: Skin is dry  She is not diaphoretic  Psychiatric: She has a normal mood and affect  Additional Data:     Labs:      Results from last 7 days  Lab Units 10/23/17  0619   WBC Thousand/uL 10 80   HEMOGLOBIN g/dL 14 7   HEMATOCRIT % 45 2   PLATELETS Thousands/uL 330   LYMPHO PCT % 20   MONO PCT MAN % 7   EOSINO PCT MANUAL % 3       Results from last 7 days  Lab Units 10/23/17  0619   SODIUM mmol/L 141   POTASSIUM mmol/L 4 6   CHLORIDE mmol/L 103   CO2 mmol/L 24   BUN mg/dL 18   CREATININE mg/dL 0 92   CALCIUM mg/dL 9 3   TOTAL PROTEIN g/dL 8 1   BILIRUBIN TOTAL mg/dL 0 40   ALK PHOS U/L 84   ALT U/L 25   AST U/L 33   GLUCOSE RANDOM mg/dL 131           * I Have Reviewed All Lab Data Listed Above  * Additional Pertinent Lab Tests Reviewed: [facility] Admission Reviewed    Imaging:  Ct Head Without Contrast    Result Date: 10/23/2017  Narrative: CT BRAIN - WITHOUT CONTRAST INDICATION:  Vertigo  COMPARISON:  None  TECHNIQUE:  CT examination of the brain was performed  In addition to axial images, coronal reformatted images were created and submitted for interpretation  Radiation dose length product (DLP) for this visit:  1060 68 mGy-cm     This examination, like all CT scans performed in the [facility], was performed utilizing techniques to minimize radiation dose exposure, including the use of iterative reconstruction and automated exposure control  IMAGE QUALITY:  Diagnostic  FINDINGS:  PARENCHYMA:  No intracranial mass, mass effect or midline shift  No CT signs of acute infarction  There is no parenchymal hemorrhage  VENTRICLES AND EXTRA-AXIAL SPACES:  Normal for patient's age  VISUALIZED ORBITS AND PARANASAL SINUSES:  Unremarkable  CALVARIUM AND EXTRACRANIAL SOFT TISSUES:   Normal      Impression: No acute intracranial abnormality  Workstation performed: [id number]     Imaging Reports Reviewed by myself    Cultures:   Blood Culture: No results found for: BLOODCX  Urine Culture: No results found for: URINECX  Sputum Culture: No components found for: SPUTUMCX  Wound Culture: No results found for: WOUNDCULT    Last 24 Hours Medication List:     aspirin 81 mg Oral Daily   fish oil 1,000 mg Oral Daily   meclizine 25 mg Oral Q8H [facility] intermediate        Today, Patient Was Seen By: Tito Smith DO    ** Please Note: Dragon 360 Dictation voice to text software may have been used in the creation of this document   **

## 2017-10-25 VITALS
OXYGEN SATURATION: 96 % | TEMPERATURE: 98.5 F | HEIGHT: 68 IN | SYSTOLIC BLOOD PRESSURE: 125 MMHG | RESPIRATION RATE: 18 BRPM | BODY MASS INDEX: 27.06 KG/M2 | HEART RATE: 74 BPM | WEIGHT: 178.57 LBS | DIASTOLIC BLOOD PRESSURE: 59 MMHG

## 2017-10-25 PROBLEM — E78.5 DYSLIPIDEMIA: Status: ACTIVE | Noted: 2017-10-25

## 2017-10-25 LAB
B BURGDOR IGG PATRN SER IB-IMP: NEGATIVE
B BURGDOR IGM PATRN SER IB-IMP: NEGATIVE
B BURGDOR18KD IGG SER QL IB: PRESENT
B BURGDOR23KD IGG SER QL IB: PRESENT
B BURGDOR23KD IGM SER QL IB: PRESENT
B BURGDOR28KD IGG SER QL IB: ABNORMAL
B BURGDOR30KD IGG SER QL IB: ABNORMAL
B BURGDOR39KD IGG SER QL IB: ABNORMAL
B BURGDOR39KD IGM SER QL IB: ABNORMAL
B BURGDOR41KD IGG SER QL IB: PRESENT
B BURGDOR41KD IGM SER QL IB: ABNORMAL
B BURGDOR45KD IGG SER QL IB: ABNORMAL
B BURGDOR58KD IGG SER QL IB: ABNORMAL
B BURGDOR66KD IGG SER QL IB: ABNORMAL
B BURGDOR93KD IGG SER QL IB: ABNORMAL

## 2017-10-25 RX ORDER — MECLIZINE HYDROCHLORIDE 25 MG/1
25 TABLET ORAL EVERY 8 HOURS PRN
Qty: 30 TABLET | Refills: 0 | Status: SHIPPED | OUTPATIENT
Start: 2017-10-25 | End: 2018-12-05 | Stop reason: ALTCHOICE

## 2017-10-25 RX ADMIN — MECLIZINE HYDROCHLORIDE 25 MG: 25 TABLET ORAL at 14:19

## 2017-10-25 RX ADMIN — MECLIZINE HYDROCHLORIDE 25 MG: 25 TABLET ORAL at 05:54

## 2017-10-25 RX ADMIN — Medication 1000 MG: at 09:30

## 2017-10-25 RX ADMIN — ONDANSETRON 4 MG: 2 INJECTION INTRAMUSCULAR; INTRAVENOUS at 07:07

## 2017-10-25 RX ADMIN — ASPIRIN 81 MG: 81 TABLET, COATED ORAL at 09:30

## 2017-10-25 NOTE — PLAN OF CARE
Problem: PAIN - ADULT  Goal: Verbalizes/displays adequate comfort level or baseline comfort level  Interventions:  - Encourage patient to monitor pain and request assistance  - Assess pain using appropriate pain scale  - Administer analgesics based on type and severity of pain and evaluate response  - Implement non-pharmacological measures as appropriate and evaluate response  - Consider cultural and social influences on pain and pain management  - Notify physician/advanced practitioner if interventions unsuccessful or patient reports new pain   Outcome: Completed Date Met: 10/25/17      Problem: SAFETY ADULT  Goal: Patient will remain free of falls  INTERVENTIONS:  - Assess patient frequently for physical needs  -  Identify  physical deficits and behaviors that affect risk of falls    -  [place] fall precautions as indicated by assessment   - Educate patient/family on patient safety including physical limitations  - Instruct patient to call for assistance with activity based on assessment  - Modify environment to reduce risk of injury  - Consider OT/PT consult to assist with strengthening/mobility    Outcome: Completed Date Met: 10/25/17    Goal: Maintain or return to baseline ADL function  INTERVENTIONS:  -  Assess patient's ability to carry out ADLs; assess patient's baseline for ADL function and identify physical deficits which impact ability to perform ADLs (bathing, care of mouth/teeth, toileting, grooming, dressing, etc )  - Assess/evaluate cause of self-care deficits   - Assess range of motion  - Assess patient's mobility; develop plan if impaired  - Assess patient's need for assistive devices and provide as appropriate  - Encourage maximum independence but intervene and supervise when necessary  ¯ Involve family in performance of ADLs  ¯ Assess for home care needs following discharge   ¯ Request OT consult to assist with ADL evaluation and planning for discharge  ¯ Provide patient education as appropriate Outcome: Completed Date Met: 10/25/17    Goal: Maintain or return mobility status to optimal level  INTERVENTIONS:  - Assess patient's baseline mobility status (ambulation, transfers, stairs, etc )    - Identify  physical deficits and behaviors that affect mobility  - [place] fall precautions as indicated by assessment  - Record patient progress and toleration of activity level on Mobility SBAR; progress patient to next Phase/Stage  - Instruct patient to call for assistance with activity based on assessment     Outcome: Completed Date Met: 10/25/17      Problem: DISCHARGE PLANNING  Goal: Discharge to home or other facility with appropriate resources  INTERVENTIONS:  - Identify barriers to discharge w/patient and caregiver  - Arrange for needed discharge resources and transportation as appropriate  - Identify discharge learning needs (meds, wound care, etc )  - Arrange for interpretive services to assist at discharge as needed  - Refer to Case Management Department for coordinating discharge planning if the patient needs post-hospital services based on physician/advanced practitioner order or complex needs related to functional status, cognitive ability, or social support system   Outcome: Completed Date Met: 10/25/17      Problem: Knowledge Deficit  Goal: Patient/family/caregiver demonstrates understanding of disease process, treatment plan, medications, and discharge instructions  Complete learning assessment and assess knowledge base  Interventions:  - Provide teaching at level of understanding  - Provide teaching via preferred learning methods   Outcome: Completed Date Met: 10/25/17      Problem: NEUROSENSORY - ADULT  Goal: Achieves stable or improved neurological status  INTERVENTIONS  - Monitor and report changes in neurological status  - Monitor temperature, glucose, and sodium or any other associated labs   Initiate appropriate interventions as ordered   - Administer anti-vertigo medication as ordered Outcome: Completed Date Met: 10/25/17      Problem: GASTROINTESTINAL - ADULT  Goal: Minimal or absence of nausea and/or vomiting  INTERVENTIONS:  - Administer IV fluids as ordered to ensure adequate hydration  - Maintain NPO status until nausea and vomiting are resolved  - Administer ordered antiemetic medications as needed  - Provide nonpharmacologic comfort measures as appropriate  - Advance diet as tolerated, if ordered     Outcome: Completed Date Met: 10/25/17      Problem: DISCHARGE PLANNING - CARE MANAGEMENT  Goal: Discharge to post-acute care or home with appropriate resources  INTERVENTIONS:  - Conduct assessment to determine patient/family and health care team treatment goals, and need for post-acute services based on payer coverage, community resources, and patient preferences, and barriers to discharge  - Address psychosocial, clinical, and financial barriers to discharge as identified in assessment in conjunction with the patient/family and health care team  - Arrange appropriate level of post-acute services according to patient's   needs and preference and payer coverage in collaboration with the physician and health care team  - Communicate with and update the patient/family, physician, and health care team regarding progress on the discharge plan  - Arrange appropriate transportation to post-acute venues   Outcome: Completed Date Met: 10/25/17

## 2017-10-25 NOTE — SOCIAL WORK
Pt written for d/c today  RW provided to pt from stock  Referral made to [facility] car  No other d/c needs

## 2017-10-25 NOTE — DISCHARGE SUMMARY
Discharge Summary - [address] [facility]    Patient Information: Marquis Murrieta 59 y o  female MRN: [id number]  Unit/Bed#: [address] [address] Encounter: [id number]    Discharging Physician / Practitioner: Kirsten Saucedo DO  PCP: No primary care provider on file  Admission Date: 10/23/2017  Discharge Date: 10/25/17    Reason for Admission: Dizziness (patient woke up this 0400 with dizziness and nausea, patient states can't lay down, did have one episode of vomiting this am, describes as room spinning this am) and Nausea      Discharge Diagnoses:     Principal Problem:    Vertigo  Active Problems:    Dyslipidemia  Resolved Problems:    * No resolved hospital problems  *      Consultations During Hospital Stay:  IP CONSULT TO CASE MANAGEMENT  IP CONSULT TO NEUROLOGY    Procedures Performed:     · none    Significant Findings:     · See hospital course    Imaging while in hospital:    Ct Head Without Contrast    Result Date: 10/23/2017  Narrative: CT BRAIN - WITHOUT CONTRAST INDICATION:  Vertigo  COMPARISON:  None  TECHNIQUE:  CT examination of the brain was performed  In addition to axial images, coronal reformatted images were created and submitted for interpretation  Radiation dose length product (DLP) for this visit:  1060 68 mGy-cm   This examination, like all CT scans performed in the [facility], was performed utilizing techniques to minimize radiation dose exposure, including the use of iterative reconstruction and automated exposure control  IMAGE QUALITY:  Diagnostic  FINDINGS:  PARENCHYMA:  No intracranial mass, mass effect or midline shift  No CT signs of acute infarction  There is no parenchymal hemorrhage  VENTRICLES AND EXTRA-AXIAL SPACES:  Normal for patient's age  VISUALIZED ORBITS AND PARANASAL SINUSES:  Unremarkable  CALVARIUM AND EXTRACRANIAL SOFT TISSUES:   Normal      Impression: No acute intracranial abnormality   Workstation performed: [id number]       Incidental Findings:   · none     Test Results Pending at Discharge (will require follow up):   · As per After Visit Summary     Outpatient Tests Requested:  · none    Complications:  See hospital course    Hospital Course:     Delfin Zhou is a 59 y o  female patient who originally presented to the hospital on 10/23/2017 due to complaints of dizziness  She felt as if the room was spinning associated with nausea and vomiting  CT head was negative for any acute intracranial abnormality  Her dizziness worsened with movement of the head and changing positions  She was started on scheduled meclizine for further management of vertigo  Due to persistent symptoms, Neurology was consulted the next day  Her vertigo is most likely benign paroxysmal positional vertigo  She was continued On meclizine and by day of discharge her dizziness had improved  She was discharged home and advised to follow up at the balance Center for canalith repositioning and advised to take meclizine p r n  for vertigo  Patient was provided with rolling walker prior to discharge for more stability during ambulation    Condition at Discharge: Stable    Discharge Day Visit / Exam:     Subjective:  Still with some dizziness with movements but improved from prior  Able to ambulate better    Vitals: Blood Pressure: 125/59 (10/25/17 1130)  Pulse: 74 (10/25/17 1130)  Temperature: 98 5 °F (36 9 °C) (10/25/17 1130)  Temp Source: Oral (10/25/17 1130)  Respirations: 18 (10/25/17 1130)  Height: 5' 7 5" (171 5 cm) (10/23/17 5799)  Weight - Scale: 81 kg (178 lb 9 2 oz) (10/23/17 0937)  SpO2: 96 % (10/25/17 1130)  Exam:   Physical Exam   Constitutional: She is oriented to person, place, and time  She appears well-developed and well-nourished  No distress  HENT:   Head: Normocephalic and atraumatic  Eyes: EOM are normal  Right eye exhibits no discharge  Left eye exhibits no discharge  No scleral icterus  Neck: Neck supple  No tracheal deviation present  Cardiovascular: Normal rate and regular rhythm  Pulmonary/Chest: Effort normal and breath sounds normal  No respiratory distress  She has no wheezes  She has no rales  Abdominal: Soft  Bowel sounds are normal  She exhibits no distension  There is no tenderness  Musculoskeletal: She exhibits no edema  Neurological: She is alert and oriented to person, place, and time  No cranial nerve deficit  She exhibits normal muscle tone  Skin: Skin is dry  She is not diaphoretic  Psychiatric: She has a normal mood and affect  Discharge instructions/Information to patient and family:(Discharge Medications and Follow up):   See after visit summary for information provided to patient and family  Provisions for Follow-Up Care:  See after visit summary for information related to follow-up care and any pertinent home health orders  Disposition: Home    Planned Readmission:  No     Discharge Statement:  I spent 30 minutes discharging the patient  This time was spent on the day of discharge  I had direct contact with the patient on the day of discharge  Greater than 50% of the total time was spent examining patient, answering all patient questions, arranging and discussing plan of care with patient as well as directly providing post-discharge instructions  Additional time then spent on discharge activities  Discharge Medications:  See after visit summary for reconciled discharge medications provided to patient and family  ** Please Note: Dragon 360 Dictation voice to text software may have been used in the creation of this document   **

## 2017-10-27 ENCOUNTER — APPOINTMENT (OUTPATIENT)
Dept: PHYSICAL THERAPY | Facility: CLINIC | Age: 57
End: 2017-10-27
Payer: COMMERCIAL

## 2017-10-27 PROCEDURE — 97163 PT EVAL HIGH COMPLEX 45 MIN: CPT

## 2017-10-27 PROCEDURE — G8979 MOBILITY GOAL STATUS: HCPCS

## 2017-10-27 PROCEDURE — G8978 MOBILITY CURRENT STATUS: HCPCS

## 2017-10-30 ENCOUNTER — APPOINTMENT (OUTPATIENT)
Dept: PHYSICAL THERAPY | Facility: CLINIC | Age: 57
End: 2017-10-30
Payer: COMMERCIAL

## 2017-10-30 DIAGNOSIS — R42 VERTIGO: ICD-10-CM

## 2017-10-30 PROCEDURE — 97112 NEUROMUSCULAR REEDUCATION: CPT

## 2017-11-01 ENCOUNTER — APPOINTMENT (OUTPATIENT)
Dept: PHYSICAL THERAPY | Facility: CLINIC | Age: 57
End: 2017-11-01
Payer: COMMERCIAL

## 2017-11-01 PROCEDURE — 97112 NEUROMUSCULAR REEDUCATION: CPT

## 2018-06-11 ENCOUNTER — EVALUATION (OUTPATIENT)
Dept: PHYSICAL THERAPY | Facility: CLINIC | Age: 58
End: 2018-06-11
Payer: COMMERCIAL

## 2018-06-11 ENCOUNTER — TRANSCRIBE ORDERS (OUTPATIENT)
Dept: PHYSICAL THERAPY | Facility: CLINIC | Age: 58
End: 2018-06-11

## 2018-06-11 DIAGNOSIS — H81.11 BENIGN PAROXYSMAL VERTIGO OF RIGHT EAR: ICD-10-CM

## 2018-06-11 DIAGNOSIS — H81.11 BENIGN PAROXYSMAL VERTIGO, RIGHT EAR: ICD-10-CM

## 2018-06-11 DIAGNOSIS — R42 DIZZINESS: Primary | ICD-10-CM

## 2018-06-11 PROCEDURE — 97112 NEUROMUSCULAR REEDUCATION: CPT

## 2018-06-11 PROCEDURE — 97162 PT EVAL MOD COMPLEX 30 MIN: CPT

## 2018-06-11 PROCEDURE — G8982 BODY POS GOAL STATUS: HCPCS

## 2018-06-11 PROCEDURE — G8981 BODY POS CURRENT STATUS: HCPCS
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
## 2018-06-13 ENCOUNTER — OFFICE VISIT (OUTPATIENT)
Dept: PHYSICAL THERAPY | Facility: CLINIC | Age: 58
End: 2018-06-13
Payer: COMMERCIAL

## 2018-06-13 DIAGNOSIS — R42 DIZZINESS: Primary | ICD-10-CM

## 2018-06-13 DIAGNOSIS — H81.11 BENIGN PAROXYSMAL VERTIGO, RIGHT EAR: ICD-10-CM

## 2018-06-13 PROCEDURE — 97140 MANUAL THERAPY 1/> REGIONS: CPT | Performed by: PHYSICAL THERAPIST

## 2018-06-13 PROCEDURE — 97530 THERAPEUTIC ACTIVITIES: CPT | Performed by: PHYSICAL THERAPIST

## 2018-06-13 PROCEDURE — 97112 NEUROMUSCULAR REEDUCATION: CPT | Performed by: PHYSICAL THERAPIST

## 2018-06-13 NOTE — PROGRESS NOTES
Daily Note     Today's date: 2018  Patient name: Valeria Alegre  : 1960  MRN: [id number]  Referring provider: Filiberto Mercedes DO  Dx:   Encounter Diagnosis     ICD-10-CM    1  Dizziness R42    2  Benign paroxysmal vertigo, right ear H81 11                   Subjective:       Objective: See treatment diary below      Assessment: Tolerated treatment {Tolerated treatment :[id number]}   Patient {assessment:[id number]}      Plan: {PLAN:[id number]} Alert and oriented, no focal deficits, no motor or sensory deficits.

## 2018-06-13 NOTE — PROGRESS NOTES
Daily Note     Today's date: 2018  Patient name: Nam Laureano  : 1960  MRN: [id number]  Referring provider: Uri Rodgers DO  Dx:   Encounter Diagnosis     ICD-10-CM    1  Dizziness R42    2  Benign paroxysmal vertigo, right ear H81 11                   Subjective: Patient continues to have dizziness with positional changes  Noted increased neck pain with discomfort  Patient thinks part of the problem is her neck and her posture during the day with looking down and up frequently day in and day out  Objective: See treatment diary below  Precautions: Dizziness with neck extension  Daily Treatment Diary     Specialty Daily Treatment Diary     Manual  18       R Eply 1 rep, No symptoms of dizziness in sitting  No nystagmus, hesitation through movement  Exercise Diary         Foam EO/EC         Foam HT HV        VOR 1         VOR 2         VOR X         Gait with diagonal HT         Fall track  LOS          Fall track dynamic CW, CCW                                                                                                            Modalities                                  18  Toby-Hallpike testing neg BL   Roll test: + right side with violent response     Manual C spine 15 minutes with gentle TRP with noted spasms on right para spinal region     Ice pack 10 minutes to reduce pain on right side  Oculomotor Screen   -Smooth Pursuits-   Gaze holding nystagmus- nml  Spontaneous nystagmus room light- nml  -Near Point Convergence- Abnormal   -Saccades- Nml   -VOR Screen- Dizziness   -VOR Cancel- Dizziness   -Head Thrust-  R abnormal       Assessment: Tolerated treatment fair  Patient severe spinning dizziness with horizontal testing on right side  Noted increased autonomic reaction with increase sweating, light headedness with nausea, use of ice pack which reduced symptoms   Patient noted to have spasms on right side due to increase stress on the neck from testing position  Patient noted that neck pain is similar to current job function with repetitive looking down at papers and looking up to perform  into a computer  Manuals helped reduce neck discomfort to a certain degee with gentle soft tissue mob with light TRP to paraspinal region BL  Patient notes neck pain is limiting functional mobility with ADLs and IADLS  Patient was given information for Neuro for consult due to debilitating pain in the neck leading to vestibular dysfunction and possible occurrence of BPPV  Oculomotor  testing noted dizziness with movement with VOR x 1, VOR cx and head thrust test right  Patient noted to have abnormal NPC as well with blurred vision  Patient oculomotor findings may be due to limited C spine range of motion and willingness  for head movement with disuse Cause  PT will follow up with Neuro       Plan: Continue per plan of care

## 2018-06-18 ENCOUNTER — OFFICE VISIT (OUTPATIENT)
Dept: NEUROLOGY | Facility: CLINIC | Age: 58
End: 2018-06-18
Payer: COMMERCIAL

## 2018-06-18 ENCOUNTER — TELEPHONE (OUTPATIENT)
Dept: PHYSICAL THERAPY | Facility: CLINIC | Age: 58
End: 2018-06-18

## 2018-06-18 ENCOUNTER — APPOINTMENT (OUTPATIENT)
Dept: PHYSICAL THERAPY | Facility: CLINIC | Age: 58
End: 2018-06-18
Payer: COMMERCIAL

## 2018-06-18 VITALS
DIASTOLIC BLOOD PRESSURE: 62 MMHG | WEIGHT: 187 LBS | HEIGHT: 67 IN | HEART RATE: 81 BPM | SYSTOLIC BLOOD PRESSURE: 112 MMHG | BODY MASS INDEX: 29.35 KG/M2

## 2018-06-18 DIAGNOSIS — M54.2 NECK PAIN: Primary | ICD-10-CM

## 2018-06-18 DIAGNOSIS — H81.10 BENIGN PAROXYSMAL POSITIONAL VERTIGO, UNSPECIFIED LATERALITY: ICD-10-CM

## 2018-06-18 DIAGNOSIS — I82.409 DEEP VEIN THROMBOSIS (DVT) DURING CURRENT HOSPITALIZATION (HCC): ICD-10-CM

## 2018-06-18 DIAGNOSIS — M50.90 CERVICAL DISC DISEASE: ICD-10-CM

## 2018-06-18 PROCEDURE — 99214 OFFICE O/P EST MOD 30 MIN: CPT | Performed by: PSYCHIATRY & NEUROLOGY

## 2018-06-18 RX ORDER — CYCLOBENZAPRINE HCL 10 MG
10 TABLET ORAL 3 TIMES DAILY PRN
Qty: 90 TABLET | Refills: 2 | Status: SHIPPED | OUTPATIENT
Start: 2018-06-18 | End: 2018-12-05 | Stop reason: ALTCHOICE

## 2018-06-18 RX ORDER — IBUPROFEN 800 MG/1
800 TABLET ORAL DAILY PRN
Qty: 30 TABLET | Refills: 0 | Status: SHIPPED | OUTPATIENT
Start: 2018-06-18

## 2018-06-18 NOTE — PROGRESS NOTES
Return NeuroOutpatient Note        Alexi Barone  [id number]  4 y o   1960       Dizziness        History obtained from: Patient     HPI/Subjective:  Alexi Barone is a 58 yo F with no significant PMH presents as follow up  She was evaluated by me in October of 2017 for BPPV  She did better but recently it returned  [facility]  Vertigo is better now  She also reports some neck pain/burning  She does have tightness over muscle  She says her work requires constantly looking down and up repeatedly  She denies associated paresthesia  Denies associated focal weakness  She has had this pain for quite some time  Denies associated headache             Past Medical History:   Diagnosis Date    Neck pain     Varicose vein of leg     Vertigo      Social History     Social History    Marital status: /Civil Union     Spouse name: N/A    Number of children: N/A    Years of education: N/A     Occupational History    Not on file       Social History Main Topics    Smoking status: Never Smoker    Smokeless tobacco: Never Used    Alcohol use Yes      Comment: rare    Drug use: No    Sexual activity: Not on file     Other Topics Concern    Not on file     Social History Narrative    No narrative on file     Family History   Problem Relation Age of Onset    Heart attack Mother     Transient ischemic attack Mother     Diabetes Father     Breast cancer Sister     No Known Problems Son     No Known Problems Daughter      Allergies   Allergen Reactions    Augmentin [Amoxicillin-Pot Clavulanate] GI Intolerance    Penicillins Rash     Current Outpatient Prescriptions on File Prior to Visit   Medication Sig Dispense Refill    aspirin (ECOTRIN LOW STRENGTH) 81 mg EC tablet Take 81 mg by mouth daily      omega-3-acid ethyl esters (LOVAZA) 1 g capsule Take 1 g by mouth daily        meclizine (ANTIVERT) 25 mg tablet Take 1 tablet by mouth every 8 (eight) hours as needed for dizziness 30 tablet 0     No current facility-administered medications on file prior to visit  Review of Systems   Refer to positive review of systems in HPI  Constitutional- No fever  Eyes- No visual change  ENT- Hearing normal  CV- No chest pain  Resp- No Shortness of breath  GI- No diarrhea  - Bladder normal  MS- No Arthritis   Skin- No rash  Psych- No depression  Endo- No DM  Heme- No nodes    Vitals:    06/18/18 1353   BP: 112/62   BP Location: Left arm   Patient Position: Sitting   Pulse: 81   Weight: 84 8 kg (187 lb)   Height: 5' 6 75" (1 695 m)       PHYSICAL EXAM:  Appearance: No Acute Distress  Ophthalmoscopic: Disc Flat, Normal fundus  Mental status:  Orientation: Awake, Alert, and Orientedx3  Memory: Registation 3/3 Recall 3/3  Attention: normal  Knowledge: good  Language: No aphasia  Speech: No dysarthria  Cranial Nerves:  2 No Visual Defect on Confrontation, Pupils round, equal, reactive to light  3,4,6 Extraocular Movements Intact, no nystagmus  5 Facial Sensation Intact  7 No facial asymmetry  8 Intact hearing  9,10 Palate symmetric, normal gag  11 Good shoulder shrug  12 Tongue Midline  Gait: Stable  Coordination: No ataxia with finger to nose testing, and heel to shin  Sensory: Intact, Symmetric to pinprick, light touch, vibration, and joint position  Muscle Tone: Normal              Muscle exam:  Arm Right Left Leg Right Left   Deltoid 5/5 5/5 Iliopsoas 5/5 5/5   Biceps 5/5 5/5 Quads 5/5 5/5   Triceps 5/5 5/5 Hamstrings 5/5 5/5   Wrist Extension 5/5 5/5 Ankle Dorsi Flexion 5/5 5/5   Wrist Flexion 5/5 5/5 Ankle Plantar Flexion 5/5 5/5   Interossei 5/5 5/5 Ankle Eversion 5/5 5/5   APB 5/5 5/5 Ankle Inversion 5/5 5/5       Reflexes   RJ BJ TJ KJ AJ Plantars [place]'s   Right 2+ 2+ 2+ 2+ 2+ Downgoing Not present   Left 2+ 2+ 2+ 2+ 2+ Downgoing Not present     Personal review of  Labs:                    Diagnoses and all orders for this visit:        1   Neck pain  cyclobenzaprine (FLEXERIL) 10 mg tablet    Ambulatory referral to Physical Therapy    ibuprofen (MOTRIN) 800 mg tablet   2  Cervical disc disease  cyclobenzaprine (FLEXERIL) 10 mg tablet    Ambulatory referral to Physical Therapy    ibuprofen (MOTRIN) 800 mg tablet   3  Benign paroxysmal positional vertigo, unspecified laterality     4  Deep vein [facility] (DVT) [facility]  VAS lower limb venous duplex study, unilateral/limited     It seems that patient is experiencing neck pain from disc degenerative disease  Will try flexeril as muscle relaxant  Will refer her for PT for neck therapy  If no improvement of her sxs, will obtain MRI cervical spine  Since her vertigo is rare, I will not place her on preventive medication  Asked her to hold off on vertigo tx but focus on neck therapy  She is asked to take ibuprofen occasionally to relieve pain  For recent right leg pain and h/o dvt, will get doppler  Asked her to make an appointment with her vascular surgeon  Counseling Documentation:  The patient and/or patient's family were  counseled regarding diagnostic results  Instructions for management,risk factor reductions,prognosis of disease were discussed  Patient and family were educated regarding impressions,risks and benefits of treatment options,importance of compliance with treatment        Total time of encounter:  30 min  More than 50% of the time was used in counseling and/or coordination of care  Extent of counseling and/or coordination of care        Stefanie Davies MD  [address] [place] [address] [facility]  [facility] [place] [address]  Hugh Mcghee   [phone]

## 2018-06-18 NOTE — TELEPHONE ENCOUNTER
Called pt when she did not show  Pt states she tried to call twice today and could not get through, states she got a "number disconnected message " Pt saw Neurologist today and got out too late to come to PT  She has a new script to include cx tx  Pt will resume PT 6/21/18

## 2018-06-19 ENCOUNTER — HOSPITAL ENCOUNTER (EMERGENCY)
Facility: HOSPITAL | Age: 58
Discharge: HOME | End: 2018-06-19
Attending: EMERGENCY MEDICINE
Payer: COMMERCIAL

## 2018-06-19 ENCOUNTER — APPOINTMENT (EMERGENCY)
Dept: RADIOLOGY | Facility: HOSPITAL | Age: 58
End: 2018-06-19
Payer: COMMERCIAL

## 2018-06-19 VITALS
BODY MASS INDEX: 29.89 KG/M2 | DIASTOLIC BLOOD PRESSURE: 55 MMHG | TEMPERATURE: 97.7 F | HEART RATE: 66 BPM | WEIGHT: 186 LBS | SYSTOLIC BLOOD PRESSURE: 140 MMHG | RESPIRATION RATE: 16 BRPM | HEIGHT: 66 IN | OXYGEN SATURATION: 96 %

## 2018-06-19 DIAGNOSIS — I83.91 VARICOSE VEINS OF RIGHT LOWER EXTREMITY: ICD-10-CM

## 2018-06-19 DIAGNOSIS — I80.9 SUPERFICIAL THROMBOPHLEBITIS: Primary | ICD-10-CM

## 2018-06-19 LAB
ANION GAP SERPL CALCULATED.3IONS-SCNC: 8 MMOL/L (ref 4–13)
APTT PPP: 24 SECONDS (ref 24–33)
BASOPHILS # BLD AUTO: 0.04 THOUSANDS/ΜL (ref 0–0.1)
BASOPHILS NFR BLD AUTO: 1 % (ref 0–1)
BUN SERPL-MCNC: 12 MG/DL (ref 5–25)
CALCIUM SERPL-MCNC: 8.8 MG/DL (ref 8.3–10.1)
CHLORIDE SERPL-SCNC: 104 MMOL/L (ref 100–108)
CO2 SERPL-SCNC: 28 MMOL/L (ref 21–32)
CREAT SERPL-MCNC: 0.82 MG/DL (ref 0.6–1.3)
EOSINOPHIL # BLD AUTO: 0.15 THOUSAND/ΜL (ref 0–0.61)
EOSINOPHIL NFR BLD AUTO: 2 % (ref 0–6)
ERYTHROCYTE [DISTWIDTH] IN BLOOD BY AUTOMATED COUNT: 13.7 % (ref 11.6–15.1)
GFR SERPL CREATININE-BSD FRML MDRD: 79 ML/MIN/1.73SQ M
GLUCOSE SERPL-MCNC: 96 MG/DL (ref 65–140)
HCT VFR BLD AUTO: 41.9 % (ref 34.8–46.1)
HGB BLD-MCNC: 13.5 G/DL (ref 11.5–15.4)
IMM GRANULOCYTES # BLD AUTO: 0.03 THOUSAND/UL (ref 0–0.2)
IMM GRANULOCYTES NFR BLD AUTO: 0 % (ref 0–2)
INR PPP: 1 (ref 0.86–1.16)
LYMPHOCYTES # BLD AUTO: 1.79 THOUSANDS/ΜL (ref 0.6–4.47)
LYMPHOCYTES NFR BLD AUTO: 23 % (ref 14–44)
MCH RBC QN AUTO: 28.2 PG (ref 26.8–34.3)
MCHC RBC AUTO-ENTMCNC: 32.2 G/DL (ref 31.4–37.4)
MCV RBC AUTO: 88 FL (ref 82–98)
MONOCYTES # BLD AUTO: 0.47 THOUSAND/ΜL (ref 0.17–1.22)
MONOCYTES NFR BLD AUTO: 6 % (ref 4–12)
NEUTROPHILS # BLD AUTO: 5.16 THOUSANDS/ΜL (ref 1.85–7.62)
NEUTS SEG NFR BLD AUTO: 68 % (ref 43–75)
NRBC BLD AUTO-RTO: 0 /100 WBCS
PLATELET # BLD AUTO: 252 THOUSANDS/UL (ref 149–390)
PMV BLD AUTO: 10.1 FL (ref 8.9–12.7)
POTASSIUM SERPL-SCNC: 4.1 MMOL/L (ref 3.5–5.3)
PROTHROMBIN TIME: 10.5 SECONDS (ref 9.4–11.7)
RBC # BLD AUTO: 4.79 MILLION/UL (ref 3.81–5.12)
SODIUM SERPL-SCNC: 140 MMOL/L (ref 136–145)
WBC # BLD AUTO: 7.64 THOUSAND/UL (ref 4.31–10.16)

## 2018-06-19 PROCEDURE — 80048 BASIC METABOLIC PNL TOTAL CA: CPT | Performed by: EMERGENCY MEDICINE

## 2018-06-19 PROCEDURE — 99284 EMERGENCY DEPT VISIT MOD MDM: CPT

## 2018-06-19 PROCEDURE — 93971 EXTREMITY STUDY: CPT | Performed by: SURGERY

## 2018-06-19 PROCEDURE — 85025 COMPLETE CBC W/AUTO DIFF WBC: CPT | Performed by: EMERGENCY MEDICINE

## 2018-06-19 PROCEDURE — 85610 PROTHROMBIN TIME: CPT | Performed by: EMERGENCY MEDICINE

## 2018-06-19 PROCEDURE — 93971 EXTREMITY STUDY: CPT

## 2018-06-19 PROCEDURE — 85730 THROMBOPLASTIN TIME PARTIAL: CPT | Performed by: EMERGENCY MEDICINE

## 2018-06-19 PROCEDURE — 36415 COLL VENOUS BLD VENIPUNCTURE: CPT | Performed by: EMERGENCY MEDICINE

## 2018-06-19 NOTE — ED NOTES
Patient asking about taking Asprin while on Xarelto - spoke with Dr Nalini Daniel who wants patient to stop taking Asprin until she follows up with the vascular doctor  Patient and family made aware       Chau Johnson RN  06/19/18 1153

## 2018-06-19 NOTE — ED PROVIDER NOTES
History  Chief Complaint   Patient presents with    Leg Pain     C/O pain to the R leg  Patient states that her pain started in the right calf yesterday and started in her right thigh as well today  Patient states that she has a hx of blood clots in the same leg "It feels like the same pain as my blood clot "     24-year-old female with past medical history of varicose veins, vertigo, neck pain, superficial thrombophlebitis to the right lower extremity, presents to the ER with complaint of increasing in her varicose veins and mild swelling to the right calf  Patient states that she is worried that she may have clotting in her varicose veins again  Patient previously had extensive clot to the saphenous vein and she was anticoagulated for several weeks as a result about 3 years ago  At that time patient did see a vascular surgeon and had part of her saphenous vein removed  Patient states that her symptoms today feel similar to her previous symptoms  Patient denies any chest pain, shortness of breath, nausea, vomiting, diarrhea, dysuria, headaches, weakness, dizziness  History provided by:  Patient  Leg Pain   Associated symptoms: no back pain and no fever        Prior to Admission Medications   Prescriptions Last Dose Informant Patient Reported?  Taking?   aspirin (ECOTRIN LOW STRENGTH) 81 mg EC tablet 6/19/2018 at Unknown time  Yes Yes   Sig: Take 81 mg by mouth daily   cyclobenzaprine (FLEXERIL) 10 mg tablet Unknown at Unknown time  No No   Sig: Take 1 tablet (10 mg total) by mouth 3 (three) times a day as needed for muscle spasms   ibuprofen (MOTRIN) 800 mg tablet Unknown at Unknown time  No No   Sig: Take 1 tablet (800 mg total) by mouth daily as needed for moderate pain (neck pain)   meclizine (ANTIVERT) 25 mg tablet More than a month at Unknown time  No No   Sig: Take 1 tablet by mouth every 8 (eight) hours as needed for dizziness   omega-3-acid ethyl esters (LOVAZA) 1 g capsule 6/18/2018 at Unknown time Self Yes Yes   Sig: Take 1 g by mouth daily        Facility-Administered Medications: None       Past Medical History:   Diagnosis Date    Neck pain     Varicose vein of leg     Vertigo        Past Surgical History:   Procedure Laterality Date    TONSILLECTOMY      VEIN SURGERY      vein stripping       Family History   Problem Relation Age of Onset    Heart attack Mother     Transient ischemic attack Mother     Diabetes Father     Breast cancer Sister     No Known Problems Son     No Known Problems Daughter      I have reviewed and agree with the history as documented  Social History   Substance Use Topics    Smoking status: Never Smoker    Smokeless tobacco: Never Used    Alcohol use Yes      Comment: rare        Review of Systems   Constitutional: Negative for activity change, appetite change, chills and fever  HENT: Negative for congestion and ear pain  Eyes: Negative for pain and discharge  Respiratory: Negative for cough, chest tightness, shortness of breath, wheezing and stridor  Cardiovascular: Negative for chest pain and palpitations  Gastrointestinal: Negative for abdominal distention, abdominal pain, constipation, diarrhea and nausea  Endocrine: Negative for cold intolerance  Genitourinary: Negative for dysuria, frequency and urgency  Musculoskeletal: Negative for arthralgias and back pain  Right leg pain   Skin: Negative for color change and rash  Allergic/Immunologic: Negative for environmental allergies and food allergies  Neurological: Negative for dizziness, weakness, numbness and headaches  Hematological: Negative for adenopathy  Psychiatric/Behavioral: Negative for agitation, behavioral problems and confusion  The patient is not nervous/anxious  All other systems reviewed and are negative  Physical Exam  Physical Exam   Constitutional: She is oriented to person, place, and time  She appears well-developed and well-nourished     HENT: Head: Normocephalic and atraumatic  Mouth/Throat: Oropharynx is clear and moist    Eyes: Conjunctivae and EOM are normal    Neck: Normal range of motion  Neck supple  Cardiovascular: Normal rate, regular rhythm, normal heart sounds and intact distal pulses  Pulmonary/Chest: Effort normal and breath sounds normal    Abdominal: Soft  Bowel sounds are normal  She exhibits no distension  There is no tenderness  Musculoskeletal: Normal range of motion  Multiple varicose veins noted throughout patient's right lower extremity  There is mild calf tenderness noted to palpation  No edema, erythema, warm to touch noted to examination of the right lower extremity  DP pulses intact bilateral lower extremity  Neurological: She is alert and oriented to person, place, and time  Skin: Skin is warm and dry  Psychiatric: She has a normal mood and affect  Her behavior is normal  Judgment and thought content normal    Nursing note and vitals reviewed        Vital Signs  ED Triage Vitals [06/19/18 0711]   Temperature Pulse Respirations Blood Pressure SpO2   99 6 °F (37 6 °C) 69 18 152/96 97 %      Temp Source Heart Rate Source Patient Position - Orthostatic VS BP Location FiO2 (%)   Oral Monitor Sitting Left arm --      Pain Score       8           Vitals:    06/19/18 0711 06/19/18 0933   BP: 152/96 140/55   Pulse: 69 66   Patient Position - Orthostatic VS: Sitting Lying       Visual Acuity      ED Medications  Medications - No data to display    Diagnostic Studies  Results Reviewed     Procedure Component Value Units Date/Time    Protime-INR [[id number]]  (Normal) Collected:  06/19/18 0832    Lab Status:  Final result Specimen:  Blood from Arm, Left Updated:  06/19/18 0902     Protime 10 5 seconds      INR 1 00    APTT [[id number]]  (Normal) Collected:  06/19/18 0832    Lab Status:  Final result Specimen:  Blood from Arm, Left Updated:  06/19/18 0902     PTT 24 seconds     Basic metabolic panel [[id number]] Collected: 06/19/18 4192    Lab Status:  Final result Specimen:  Blood from Arm, Left Updated:  06/19/18 0856     Sodium 140 mmol/L      Potassium 4 1 mmol/L      Chloride 104 mmol/L      CO2 28 mmol/L      Anion Gap 8 mmol/L      BUN 12 mg/dL      Creatinine 0 82 mg/dL      Glucose 96 mg/dL      Calcium 8 8 mg/dL      eGFR 79 ml/min/1 73sq m     Narrative:         National Kidney Disease Education Program recommendations are as follows:  GFR calculation is accurate only with a steady state creatinine  Chronic Kidney disease less than 60 ml/min/1 73 sq  meters  Kidney failure less than 15 ml/min/1 73 sq  meters  CBC and differential [[id number]] Collected:  06/19/18 0832    Lab Status:  Final result Specimen:  Blood from Arm, Left Updated:  06/19/18 0842     WBC 7 64 Thousand/uL      RBC 4 79 Million/uL      Hemoglobin 13 5 g/dL      Hematocrit 41 9 %      MCV 88 fL      MCH 28 2 pg      MCHC 32 2 g/dL      RDW 13 7 %      MPV 10 1 fL      Platelets 537 Thousands/uL      nRBC 0 /100 WBCs      Neutrophils Relative 68 %      Immat GRANS % 0 %      Lymphocytes Relative 23 %      Monocytes Relative 6 %      Eosinophils Relative 2 %      Basophils Relative 1 %      Neutrophils Absolute 5 16 Thousands/µL      Immature Grans Absolute 0 03 Thousand/uL      Lymphocytes Absolute 1 79 Thousands/µL      Monocytes Absolute 0 47 Thousand/µL      Eosinophils Absolute 0 15 Thousand/µL      Basophils Absolute 0 04 Thousands/µL                  VAS lower limb venous duplex study, unilateral/limited   Final Result by Conor Rosen MD (06/19 0086)                 Procedures  Procedures       Phone Contacts  ED Phone Contact    ED Course  ED Course as of Jun 19 1533   Tue Jun 19, 2018   0938 Case discussed with patient's vascular surgeon, Dr Shaquille Hackett, who reviewed venous doppler US report and recommends starting Xarelto 15mg BID for three weeks  Dr Pablo Delaney will see pt for follow and continue further dosing of Xarelto  MDM  Number of Diagnoses or Management Options  Superficial thrombophlebitis: new and requires workup  Varicose veins of right lower extremity: new and requires workup  Diagnosis management comments: Obtain blood work, venous duplex ultrasound of right lower extremity to rule out any DVT  Risk of Complications, Morbidity, and/or Mortality  General comments: Blood work is unremarkable  Patient's ultrasound right lower extremity showed multiple areas of superficial thrombophlebiti in her varicose veins as well as acute superficial thrombophlebitis noted in the proximal great  saphenous vein 2 cm from the saphenofemoral junction  Case discussed with patient's vascular surgeon, Dr Marlys Purdy, who reviewed venous doppler US report and recommends starting Xarelto 15mg BID for three weeks  Dr Josie Young will see pt for follow and continue further dosing of Xarelto  Close return instructions given to return to the ER for any worsening symptoms  Patient agrees with discharge plan  Patient well appearing at time of discharge  Patient Progress  Patient progress: stable    CritCare Time    Disposition  Final diagnoses:   Superficial thrombophlebitis   Varicose veins of right lower extremity     Time reflects when diagnosis was documented in both MDM as applicable and the Disposition within this note     Time User Action Codes Description Comment    6/19/2018  9:40 AM Law Booker Add [I80 9] Superficial thrombophlebitis     6/19/2018  9:41 AM Law Booker Add [I83 91] Varicose veins of right lower extremity       ED Disposition     ED Disposition Condition Comment    Discharge  Steffi Hernandez discharge to home/self care      Condition at discharge: Good        Follow-up Information     Follow up With Specialties Details Why Contact Sunny Purdy  Schedule an appointment as soon as possible for a visit in 2 days  [facility] Discharge Medication List as of 6/19/2018  9:43 AM      START taking these medications    Details   rivaroxaban (XARELTO) 15 & 20 MG starter pack Take 15 mg by mouth twice daily for 21 days, then 20 mg once daily thereafter , Print         CONTINUE these medications which have NOT CHANGED    Details   aspirin (ECOTRIN LOW STRENGTH) 81 mg EC tablet Take 81 mg by mouth daily, Historical Med      omega-3-acid ethyl esters (LOVAZA) 1 g capsule Take 1 g by mouth daily  , Historical Med      cyclobenzaprine (FLEXERIL) 10 mg tablet Take 1 tablet (10 mg total) by mouth 3 (three) times a day as needed for muscle spasms, Starting Mon 6/18/2018, Normal      ibuprofen (MOTRIN) 800 mg tablet Take 1 tablet (800 mg total) by mouth daily as needed for moderate pain (neck pain), Starting Mon 6/18/2018, Normal      meclizine (ANTIVERT) 25 mg tablet Take 1 tablet by mouth every 8 (eight) hours as needed for dizziness, Starting Wed 10/25/2017, Print           No discharge procedures on file      ED Provider  Electronically Signed by           Amy Clark DO  06/19/18 1850

## 2018-06-21 ENCOUNTER — APPOINTMENT (OUTPATIENT)
Dept: PHYSICAL THERAPY | Facility: CLINIC | Age: 58
End: 2018-06-21
Payer: COMMERCIAL

## 2018-06-26 ENCOUNTER — OFFICE VISIT (OUTPATIENT)
Dept: PHYSICAL THERAPY | Facility: CLINIC | Age: 58
End: 2018-06-26
Payer: COMMERCIAL

## 2018-06-26 ENCOUNTER — TRANSCRIBE ORDERS (OUTPATIENT)
Dept: PHYSICAL THERAPY | Facility: CLINIC | Age: 58
End: 2018-06-26

## 2018-06-26 DIAGNOSIS — R42 DIZZINESS: ICD-10-CM

## 2018-06-26 DIAGNOSIS — M50.90 CERVICAL DISC DISEASE: ICD-10-CM

## 2018-06-26 DIAGNOSIS — M54.2 NECK PAIN: Primary | ICD-10-CM

## 2018-06-26 PROCEDURE — 97014 ELECTRIC STIMULATION THERAPY: CPT

## 2018-06-26 PROCEDURE — 97164 PT RE-EVAL EST PLAN CARE: CPT

## 2018-06-26 NOTE — LETTER
2018    Sofia Bhat DO  [address] [place] [address]    Patient: Donya Schmitt   YOB: 1960   Date of Visit: 2018     Encounter Diagnosis     ICD-10-CM    1  Neck pain M54 2 PT plan of care cert/re-cert   2  Cervical disc disease M50 90 PT plan of care cert/re-cert   3  Dizziness R42 PT plan of care cert/re-cert       Dear Dr Gordy Roe:    Please review the attached Plan of Care from [facility] recent visit  She has been referred by her Neurologist, Dr Bharat Mason, for neck pain and cervical disc disease  Vestibular therapy will be placed on hold while her neck is treated  The PT POC is included for your information  Signature is not required  If you have any questions or concerns, please don't hesitate to call  Sincerely,    Cece Llanes, PT      Referring Provider:      I certify that I have read the below Plan of Care and certify the need for these services furnished under this plan of treatment while under my care  Sofia Bhat DO  [address]: [phone]          PT Re-Evaluation     Today's date: 2018  Patient name: Donya Schmitt  : 1960  MRN: [id number]  Referring provider: Karrie Akers DO  Dx:   Encounter Diagnosis     ICD-10-CM    1  Neck pain M54 2    2  Cervical disc disease M50 90    3  Dizziness R42        Start Time:   Stop Time: 1250  Total time in clinic (min): 56 minutes    Assessment  Impairments: abnormal or restricted ROM, abnormal movement, activity intolerance, impaired balance, lacks appropriate home exercise program, pain with function and poor posture   Patient presents with symptom irritability yes (with neck extension positioning)  Assessment details: 59year old pt returns to PT after being on hold for evaluation by Neurologist  Pt returns today with script for  Neck pain and cervical disc disease   Pt allows only a limited exam  She is very fearful of turning her head which she states sometimes causes vertigo  She will not lie supine for same reason  Advise further evaluation at next visit as pt will allow, focusing on the upper cervical spine and it's contribution to dizziness  Today she presents with reduced cx AROM with fear component, moderate muscular tenderness, especially upper Cspine/suboccipitals on the left, without neurologic signs  Advise continued PT, allowing for progressive pt comfort level and further examination  Updated scores were not sought for dizziness/imbalance as focus for now will be cervical spine  MHP and TENS applies this visit for 10 minutes  Pt reported:  Less stiffness, and rated her neck pain as 2/10 after MHP/TENS  Understanding of Dx/Px/POC: excellent  Goals  Dizziness/ initial eval:   ST - 4 weeks   1 ) Independent with early HEP  2 ) Decrease dizziness frequency to 3 episodes per week  3 ) Pt will tolerate vertical head movement with gait  LT - 8 weeks   1 ) Return to prior speed with movement for increased participation with ADL's   2 ) Comprehend and perform self eply maneuver with HEP when symptomatic  3 ) Minimal to no veering with head turns during gait  4 ) No position or activity of avoidance  Cervical 18  STG 2-4 weeks  1  Improve cervcal ROM by 25% each plane to improve head movement in ADL's    2  Pt will allow full exam/upper cervical spine mobility examination  3  Pt will be inedependent in basic HEP  LTG 4-8 weeks  1  Reduction of NDI score to 8-12% for improved tolerance to ADL's and work activity  2  CROM to 75% throughtout  3  Full cervical speed of movement with min/no dizziness for full/safe participation in ADL's/community mobility  4  Pt will be independent in advanced HEP        Plan  Patient would benefit from: skilled physical therapy  Referral necessary: No  Planned modality interventions: thermotherapy: hydrocollator packs, biofeedback and TENS  Planned therapy interventions: balance, canalith repositioning, body mechanics training, functional ROM exercises, home exercise program, therapeutic exercise, therapeutic activities, patient education, neuromuscular re-education, manual therapy and postural training  Frequency: 2x week  Duration in weeks: 8  Treatment plan discussed with: patient  Plan details: POC 18  to 18 addition of plan for cervical spine per new script from neurologist as above  Subjective Evaluation    History of Present Illness  Mechanism of injury: 59year old female returns to PT with script from neurologist for Neck pain and cervical disc disease  Focus of PT for now will be her neck  Vestibular therapy is on hold  Dizziness: is improved compared to when PT started  She can move her head better side to side, but she goes slowly  Neck pain started a few years ago  Occupation: [employer]/ department for [employer]  Neck pain: worse with looking down for prolonged periods  Improves with: heat, ice  Pain is localized, not radicular  She does not note UE weakness  Not dropping items  Denies Numbness/tingling in UE  Sleep: she has been sleeping well the past few weeks  Dizziness:Duration: 1 sec  Intense: 2/10   Frequency: 2 times a day  "off"   Recurrent probem    Quality of life: good    Pain  Current pain rating: 3  At best pain ratin  At worst pain ratin  Location: cervical pain   Quality: strain  Relieving factors: change in position and heat (avoidance of neck flexion)  Exacerbated by: prolonged neck flexion   Progression: improved    Social Support  Steps to enter house: yes (1 step)  Stairs in house: yes (stair case to 2nd  Bedroom on first floor)   Lives in: multiple-level home  Lives with: spouse    Employment status: working [employer])  Hand dominance: right  Exercise history: walks a mile a few times a week   Life stress:  Work Diagnostic Tests  CT scan: normal (last year)  Treatments  Previous treatment: physical therapy  Current treatment comments: hearing test (normal)  Patient Goals  Patient goal: Eliminate dizziness, moving at full speed  6/26/18 Neck: reduce pain, improve neck movement  Objective     Special Questions  Positive for dizziness  Negative for night pain, disturbed sleep, headaches, trouble swallowing and visual change    Ambulation     Ambulation: Level Surfaces     Additional Level Surfaces Ambulation Details  Initial dizziness eval: not updated 6/26/18, as focus has switched to  Reduction of neck pain per neurologist recommendation  Dizziness/vestibular on hold  Coordination:  Finger to nose: normal  Heel to shin:  normal  MELVIN:  UE=normal     LE =normal  Strength:  Hip flexion:  R=4 L=4  Quads:  R = 4+   L = 4+  Ankles: R=4+  L=4+  Oculomotor:  SP:Able to perform all direction but has symptom reproduction with upper quadrant bilateral   Saccades:normal , with mild undershooting  Head thrust: normal  REO:nl  REC:5 sec   SREO:NT  SREC:NT  SLS:R=  28sec L= 12 sec  Gait speed:1 4 m/sec   DGI:21/24  TUG:N/T  5XSTS: N/T    Positionals  Right Roll test:mild symptoms, no nystagmus  Left Roll test:No symptoms  Right DixHallpike:mild c/o vertigo, unable to visualize nystagmus  Left DixHallpike:mild symptoms, no nystagmus    Symptoms upon laying supine to pillows and when returning to sitting  Cervical Exam 6/28/18: She refuses cx ext, refuses to lie flat  CROM=   Flex=75%  Ext=pt refuses due to fear of vertigo starting  Rot R=75%  Rot L=50%  LF R= refuses   LF R=refuses     Tenderness: mild tenderness bilateral upper trap  Significant tenderness left suboccipital   Pt hesitant for much of exam, fearful of getting dizzy  Distraction seated: pt anxious, no change in pain   Compression seated: no change in symptoms       UE strength:   Shoulder flexion = 4/5 bilat  Abd= 4+/5  Biceps=5  Triceps=5  Wrists ext= 5/5  = WNL  Finger intrinsics= WNL  NDI= 32% impaired  Precautions: Dizziness with neck extension  Daily Treatment Diary     Specialty Daily Treatment Diary     Manual  6/1//18       R Eply 1 rep, No symptoms of dizziness in sitting  No nystagmus, hesitation through movement                                              Exercise Diary         Foam EO/EC         Foam HT HV        VOR 1         VOR 2         VOR X         Gait with diagonal HT         Fall track  LOS          Fall track dynamic CW, CCW                                                                                                            Modalities

## 2018-06-26 NOTE — PROGRESS NOTES
PT Re-Evaluation     Today's date: 2018  Patient name: Kevin Baldwin  : 1960  MRN: [id number]  Referring provider: Mona Corley DO  Dx:   Encounter Diagnosis     ICD-10-CM    1  Neck pain M54 2    2  Cervical disc disease M50 90    3  Dizziness R42        Start Time: 8402  Stop Time: 1250  Total time in clinic (min): 56 minutes    Assessment  Impairments: abnormal or restricted ROM, abnormal movement, activity intolerance, impaired balance, lacks appropriate home exercise program, pain with function and poor posture   Patient presents with symptom irritability yes (with neck extension positioning)  Assessment details: 59year old pt returns to PT after being on hold for evaluation by Neurologist  Pt returns today with script for  Neck pain and cervical disc disease  Pt allows only a limited exam   She is very fearful of turning her head which she states sometimes causes vertigo  She will not lie supine for same reason  Advise further evaluation at next visit as pt will allow, focusing on the upper cervical spine and it's contribution to dizziness  Today she presents with reduced cx AROM with fear component, moderate muscular tenderness, especially upper Cspine/suboccipitals on the left, without neurologic signs  Advise continued PT, allowing for progressive pt comfort level and further examination  Updated scores were not sought for dizziness/imbalance as focus for now will be cervical spine  MHP and TENS applies this visit for 10 minutes  Pt reported:  Less stiffness, and rated her neck pain as 2/10 after MHP/TENS  Understanding of Dx/Px/POC: excellent  Goals  Dizziness/ initial eval:   ST - 4 weeks   1 ) Independent with early HEP  2 ) Decrease dizziness frequency to 3 episodes per week  3 ) Pt will tolerate vertical head movement with gait       LT - 8 weeks   1 ) Return to prior speed with movement for increased participation with ADL's   2 ) Comprehend and perform self eply maneuver with HEP when symptomatic  3 ) Minimal to no veering with head turns during gait  4 ) No position or activity of avoidance  Cervical 6/26/18  STG 2-4 weeks  1  Improve cervcal ROM by 25% each plane to improve head movement in ADL's    2  Pt will allow full exam/upper cervical spine mobility examination  3  Pt will be inedependent in basic HEP  LTG 4-8 weeks  1  Reduction of NDI score to 8-12% for improved tolerance to ADL's and work activity  2  CROM to 75% throughtout  3  Full cervical speed of movement with min/no dizziness for full/safe participation in ADL's/community mobility  4  Pt will be independent in advanced HEP  Plan  Patient would benefit from: skilled physical therapy  Referral necessary: No  Planned modality interventions: thermotherapy: hydrocollator packs, biofeedback and TENS  Planned therapy interventions: balance, canalith repositioning, body mechanics training, functional ROM exercises, home exercise program, therapeutic exercise, therapeutic activities, patient education, neuromuscular re-education, manual therapy and postural training  Frequency: 2x week  Duration in weeks: 8  Treatment plan discussed with: patient  Plan details: POC 6/11/18  to 8/6/18 6/26/18 addition of plan for cervical spine per new script from neurologist as above  Subjective Evaluation    History of Present Illness  Mechanism of injury: 59year old female returns to PT with script from neurologist for Neck pain and cervical disc disease  Focus of PT for now will be her neck  Vestibular therapy is on hold  Dizziness: is improved compared to when PT started  She can move her head better side to side, but she goes slowly  Neck pain started a few years ago  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 [employer]  Neck pain: worse with looking down for prolonged periods  Improves with: heat, ice  Pain is localized, not radicular  She does not note UE weakness   Not dropping items  Denies Numbness/tingling in UE  Sleep: she has been sleeping well the past few weeks  Dizziness:Duration: 1 sec  Intense: 2/10   Frequency: 2 times a day  "off"   Recurrent probem    Quality of life: good    Pain  Current pain rating: 3  At best pain ratin  At worst pain ratin  Location: cervical pain   Quality: strain  Relieving factors: change in position and heat (avoidance of neck flexion)  Exacerbated by: prolonged neck flexion   Progression: improved    Social Support  Steps to enter house: yes (1 step)  Stairs in house: yes (stair case to 2nd  Bedroom on first floor)   Lives in: multiple-level home  Lives with: spouse    Employment status: working [employer])  Hand dominance: right  Exercise history: walks a mile a few times a week   Life stress: Work       Diagnostic Tests  CT scan: normal (last year)  Treatments  Previous treatment: physical therapy  Current treatment comments: hearing test (normal)  Patient Goals  Patient goal: Eliminate dizziness, moving at full speed  18 Neck: reduce pain, improve neck movement  Objective     Special Questions  Positive for dizziness  Negative for night pain, disturbed sleep, headaches, trouble swallowing and visual change    Ambulation     Ambulation: Level Surfaces     Additional Level Surfaces Ambulation Details  Initial dizziness eval: not updated 18, as focus has switched to  Reduction of neck pain per neurologist recommendation  Dizziness/vestibular on hold  Coordination:  Finger to nose: normal  Heel to shin:  normal  MELVIN:  UE=normal     LE =normal  Strength:  Hip flexion:  R=4 L=4  Quads:  R = 4+   L = 4+  Ankles: R=4+  L=4+    Oculomotor:  SP:Able to perform all direction but has symptom reproduction with upper quadrant bilateral   Saccades:normal , with mild undershooting  Head thrust: normal  REO:nl  REC:5 sec   SREO:NT  SREC:NT  SLS:R=  28sec L= 12 sec  Gait speed:1 4 m/sec DGI:21/24  TUG:N/T  5XSTS: N/T    Positionals  Right Roll test:mild symptoms, no nystagmus  Left Roll test:No symptoms  Right DixHallpike:mild c/o vertigo, unable to visualize nystagmus  Left DixHallpike:mild symptoms, no nystagmus    Symptoms upon laying supine to pillows and when returning to sitting  Cervical Exam 6/28/18: She refuses cx ext, refuses to lie flat  CROM=   Flex=75%  Ext=pt refuses due to fear of vertigo starting  Rot R=75%  Rot L=50%  LF R= refuses   LF R=refuses     Tenderness: mild tenderness bilateral upper trap  Significant tenderness left suboccipital   Pt hesitant for much of exam, fearful of getting dizzy  Distraction seated: pt anxious, no change in pain   Compression seated: no change in symptoms  UE strength:   Shoulder flexion = 4/5 bilat  Abd= 4+/5  Biceps=5  Triceps=5  Wrists ext= 5/5  = WNL  Finger intrinsics= WNL  NDI= 32% impaired  Precautions: Dizziness with neck extension  Daily Treatment Diary     Specialty Daily Treatment Diary     Manual  6/1//18       R Eply 1 rep, No symptoms of dizziness in sitting  No nystagmus, hesitation through movement                                              Exercise Diary         Foam EO/EC         Foam HT HV        VOR 1         VOR 2         VOR X         Gait with diagonal HT         Fall track  LOS          Fall track dynamic CW, CCW                                                                                                            Modalities

## 2018-06-29 ENCOUNTER — OFFICE VISIT (OUTPATIENT)
Dept: PHYSICAL THERAPY | Facility: CLINIC | Age: 58
End: 2018-06-29
Payer: COMMERCIAL

## 2018-06-29 DIAGNOSIS — M50.90 CERVICAL DISC DISEASE: ICD-10-CM

## 2018-06-29 DIAGNOSIS — R42 DIZZINESS: ICD-10-CM

## 2018-06-29 DIAGNOSIS — H81.11 BENIGN PAROXYSMAL VERTIGO, RIGHT EAR: ICD-10-CM

## 2018-06-29 DIAGNOSIS — M54.2 NECK PAIN: Primary | ICD-10-CM

## 2018-06-29 PROCEDURE — 97110 THERAPEUTIC EXERCISES: CPT | Performed by: PHYSICAL THERAPIST

## 2018-06-29 PROCEDURE — 97140 MANUAL THERAPY 1/> REGIONS: CPT | Performed by: PHYSICAL THERAPIST

## 2018-06-29 NOTE — PROGRESS NOTES
Daily Note     Today's date: 2018  Patient name: Blane Abraham  : 1960  MRN: [id number]  Referring provider: Aram Turcios,   Dx:   Encounter Diagnosis     ICD-10-CM    1  Neck pain M54 2    2  Cervical disc disease M50 90    3  Dizziness R42    4  Benign paroxysmal vertigo, right ear H81 11                   Subjective: Patient reports that she has been putting head on her neck at home  She reports that she finds reliefe  Currently neck pain 0/10 and reports stiffness She reports she still get dizzy when getting out of bed but not getting into bed  She reports she gets into bed on her right side and out on her left side  Dizziness tx still on hold as per neurology orders  Objective: See treatment diary below      Precautions: Dizziness with neck extension  Daily Treatment Diary     Specialty Daily Treatment Diary     Manual  18       R Eply 1 rep, No symptoms of dizziness in sitting  No nystagmus, hesitation through movement  TPR: SCM, UT, suboccipitals     MET: B/L rotation    STM: UT, LS, suboccipitals,         IASTM: suboccipitals, UT, LS        B/L UT stretch                          Exercise Diary   18      TB rows   15 reps x 2 set   pink      TB ER   15 reps x 2 sets   Pink                                                                                                                                                           Modalities        Hot pack  5 minutes                                Assessment: Patient tolerated session with focus on decreasing cervical hypertonicity  Hypertonicity noted in B/L UT (R>L), suboccipitals, SCM, LS  Patient tolerated postural strengthening  Increase in cervical rotation was noted after MET  Patient reported decrease in cervical stiffness post manuals along with ease of movement  Patient will continue to benefit from skilled PT to strengthen cervical stabilizers, reduce hypertonicity and improve cervical ROM         Plan: continue with POC   Door stretch for pecs   Lat pulldowns

## 2018-07-02 ENCOUNTER — OFFICE VISIT (OUTPATIENT)
Dept: PHYSICAL THERAPY | Facility: CLINIC | Age: 58
End: 2018-07-02
Payer: COMMERCIAL

## 2018-07-02 DIAGNOSIS — R42 DIZZINESS: ICD-10-CM

## 2018-07-02 DIAGNOSIS — H81.11 BENIGN PAROXYSMAL VERTIGO, RIGHT EAR: ICD-10-CM

## 2018-07-02 DIAGNOSIS — M54.2 NECK PAIN: Primary | ICD-10-CM

## 2018-07-02 DIAGNOSIS — M50.90 CERVICAL DISC DISEASE: ICD-10-CM

## 2018-07-02 PROCEDURE — 97110 THERAPEUTIC EXERCISES: CPT | Performed by: PHYSICAL THERAPIST

## 2018-07-02 PROCEDURE — 97140 MANUAL THERAPY 1/> REGIONS: CPT | Performed by: PHYSICAL THERAPIST

## 2018-07-02 NOTE — PROGRESS NOTES
Daily Note     Today's date: 2018  Patient name: Baldo Perez  : 1960  MRN: [id number]  Referring provider: Arian Jacobs DO  Dx:   Encounter Diagnosis     ICD-10-CM    1  Neck pain M54 2    2  Cervical disc disease M50 90    3  Dizziness R42    4  Benign paroxysmal vertigo, right ear H81 11                   Subjective: Patient reports that she felt more range with her neck since last PT session  Patient reports that she was able to perform household chores over the weekend without increase in neck pain  Objective: See treatment diary below    Precautions: Dizziness with neck extension  Daily Treatment Diary     Specialty Daily Treatment Diary     Manual  18     R Eply 1 rep, No symptoms of dizziness in sitting  No nystagmus, hesitation through movement  TPR: SCM, UT, suboccipitals     MET: B/L rotation    STM: UT, LS, suboccipitals,  TPR: SCM, UT, suboccipitals      STM: UT, LS, suboccipitals,     15 min       IASTM: suboccipitals, UT, LS        B/L UT stretch                          Exercise Diary   18     TB rows   15 reps x 2 set   pink 15 reps x 2 set   pink     TB ER   15 reps x 2 sets   Pink  15 reps x 2 sets   Pink      TB shoulder ext   15 reps x 2 sets   Pink      Doorway pec stretch    30 sec x 3 set                                                                                                                                         Modalities       Hot pack  5 minutes 5 minute                                Assessment: Patient tolerated session with focus on postural awareness  Apprehension of cervical movement noted secondary to decrease in cervical range and dizziness  Patient tolerated postural strengthening exercises  Decreased tolerance to deep pressure noted with manuals  Increased hypertonicity noted with cervical paraspinals, UT (R>L), SCM, subocippitals (R>L)   Patient reported relief and increased ease of movement post manuals  Patient will continue to benefit from skilled PT to strengthen cervical stabilizers, reduce hypertonicity and improve cervical ROM         Plan: continue with POC

## 2018-07-03 ENCOUNTER — APPOINTMENT (OUTPATIENT)
Dept: PHYSICAL THERAPY | Facility: CLINIC | Age: 58
End: 2018-07-03
Payer: COMMERCIAL

## 2018-07-09 ENCOUNTER — OFFICE VISIT (OUTPATIENT)
Dept: PHYSICAL THERAPY | Facility: CLINIC | Age: 58
End: 2018-07-09
Payer: COMMERCIAL

## 2018-07-09 DIAGNOSIS — R42 DIZZINESS: ICD-10-CM

## 2018-07-09 DIAGNOSIS — H81.11 BENIGN PAROXYSMAL VERTIGO, RIGHT EAR: ICD-10-CM

## 2018-07-09 DIAGNOSIS — M50.90 CERVICAL DISC DISEASE: ICD-10-CM

## 2018-07-09 DIAGNOSIS — M54.2 NECK PAIN: Primary | ICD-10-CM

## 2018-07-09 PROCEDURE — 97110 THERAPEUTIC EXERCISES: CPT

## 2018-07-09 PROCEDURE — 97112 NEUROMUSCULAR REEDUCATION: CPT

## 2018-07-09 NOTE — PROGRESS NOTES
Daily Note     Today's date: 2018  Patient name: Nam Laureano  : 1960  MRN: [id number]  Referring provider: Uri Rodgers DO  Dx:   Encounter Diagnosis     ICD-10-CM    1  Neck pain M54 2    2  Cervical disc disease M50 90    3  Dizziness R42    4  Benign paroxysmal vertigo, right ear H81 11        Start Time: 1624  Stop Time: 1710  Total time in clinic (min): 46 minutes    Subjective: States that after last session, with treatment to the center of her cervical spine, she had "funky eyes/dizziness" for a day and a half, on and off  No pain upon arrival  No dizziness  She was active at home this weekend  Objective: See treatment diary below    Precautions: Dizziness with neck extension  Daily Treatment Diary     Specialty Daily Treatment Diary     Manual  18    R Eply 1 rep, No symptoms of dizziness in sitting  No nystagmus, hesitation through movement  TPR: SCM, UT, suboccipitals     MET: B/L rotation    STM: UT, LS, suboccipitals,  TPR: SCM, UT, suboccipitals      STM: UT, LS, suboccipitals,     15 min     IASTM  IASTM: suboccipitals, UT, LS  UT bilaterally  5'    STM   B/L UT stretch  B/L UT 10'  Avoiding central tissue    TPR     UT bilat   2 points each bilaterally (mid muscle belly)                 Exercise Diary   18    TB rows   15 reps x 2 set   pink 15 reps x 2 set   pink pink  16 reps  3 sets    TB ER   15 reps x 2 sets   Pink  15 reps x 2 sets   Pink  pink  16 reps bilat  2 sets       TB shoulder ext   15 reps x 2 sets   Pink  Pink  16 repst  2 sets bilat    Doorway pec stretch    30 sec x 3 set 30 sec  3 reps                                                                                                                                        Modalities       Hot pack  5 minutes 5 minute                        HEP progressed to include TB row, ER and EXT  Assessment: No pain leaving today   HEP progressed to include TB ER, Ext and rows  See copies in media  Patient tolerated session with focus on  manual therapy  Manual therapy focus was STM and IASTM, avoiding central cx tissue at pt request, due to c/o onset of dizziness  Pt displayed nearly full right cx rotation, saying goodbye while leaving the wait room  Decreased tolerance to deep pressure noted with manuals  Increased hypertonicity noted with cervical paraspinals, UT (R>L)  Patient reported increased ease of movement post manuals  Patient will continue to benefit from skilled PT to strengthen cervical stabilizers, reduce hypertonicity and improve cervical ROM  Plan: continue with POC  Progress postural strengthening  Consider chin tucks as tolerated  Consider KT taping to UT

## 2018-07-12 ENCOUNTER — APPOINTMENT (OUTPATIENT)
Dept: PHYSICAL THERAPY | Facility: CLINIC | Age: 58
End: 2018-07-12
Payer: COMMERCIAL

## 2018-07-17 ENCOUNTER — APPOINTMENT (OUTPATIENT)
Dept: PHYSICAL THERAPY | Facility: CLINIC | Age: 58
End: 2018-07-17
Payer: COMMERCIAL

## 2018-07-19 ENCOUNTER — OFFICE VISIT (OUTPATIENT)
Dept: PHYSICAL THERAPY | Facility: CLINIC | Age: 58
End: 2018-07-19
Payer: COMMERCIAL

## 2018-07-19 DIAGNOSIS — R42 DIZZINESS: ICD-10-CM

## 2018-07-19 DIAGNOSIS — M50.90 CERVICAL DISC DISEASE: ICD-10-CM

## 2018-07-19 DIAGNOSIS — M54.2 NECK PAIN: Primary | ICD-10-CM

## 2018-07-19 PROCEDURE — 97140 MANUAL THERAPY 1/> REGIONS: CPT | Performed by: PHYSICAL THERAPIST

## 2018-07-19 NOTE — PROGRESS NOTES
Daily Note     Today's date: 2018  Patient name: Jero Colby  : 1960  MRN: [id number]  Referring provider: Melanie Lyle DO  Dx:   Encounter Diagnosis     ICD-10-CM    1  Neck pain M54 2    2  Cervical disc disease M50 90    3  Dizziness R42                   Subjective: pt notes soreness in C spine today and increase challenge with moving head  Objective: See treatment diary below    Precautions: Dizziness with neck extension  Daily Treatment Diary     Specialty Daily Treatment Diary     Manual  18     1 rep, No symptoms of dizziness in sitting  No nystagmus, hesitation through movement  TPR: SCM, UT, suboccipitals     MET: B/L rotation    STM: UT, LS, suboccipitals,  TPR: SCM, UT, suboccipitals      STM: UT, LS, suboccipitals,     15 min TPR: SCM, UT, Suboccipitals     15 minutes     IASTM  IASTM: suboccipitals, UT, LS  UT bilaterally  5'    STM   B/L UT stretch  B/L UT 10'  Avoiding central tissue    TPR     UT bilat   2 points each bilaterally (mid muscle belly)                 Exercise Diary   18    TB rows   15 reps x 2 set   pink 15 reps x 2 set   pink pink  16 reps  3 sets    TB ER   15 reps x 2 sets   Pink  15 reps x 2 sets   Pink  pink  16 reps bilat  2 sets       TB shoulder ext   15 reps x 2 sets   Pink  Pink  16 repst  2 sets bilat    Doorway pec stretch    30 sec x 3 set 30 sec  3 reps                                                                                                                                        Modalities       Hot pack  5 minutes 5 minute                        HEP progressed to include TB row, ER and EXT  Assessment: No pain leaving today  HEP progressed to include TB ER, Ext and rows  See copies in media  Patient continues to have increased hypertonicity to C spine with upper cervical region with hypersensitivity with manuals   Manuals noted to reduce soreness and improve C spine range of motion but limited  Attempted chin tucks unable to tolerate at this time  Patient will continue to benefit from skilled PT to reduce tone  Patient noted she has muscle relaxer at home but has not taken them, advised patient to take at night before bed to see if it reduces tone following instructions on pill bottom and MD recommendation which patient noted was 2 times a day  Plan: continue with POC  Progress postural strengthening  Conducted Next Visit: KT taping to UT

## 2018-07-27 ENCOUNTER — APPOINTMENT (OUTPATIENT)
Dept: PHYSICAL THERAPY | Facility: CLINIC | Age: 58
End: 2018-07-27
Payer: COMMERCIAL

## 2018-12-05 PROCEDURE — 87086 URINE CULTURE/COLONY COUNT: CPT | Performed by: NURSE PRACTITIONER

## 2018-12-05 PROCEDURE — 87186 SC STD MICRODIL/AGAR DIL: CPT | Performed by: NURSE PRACTITIONER

## 2018-12-05 PROCEDURE — 87077 CULTURE AEROBIC IDENTIFY: CPT | Performed by: NURSE PRACTITIONER

## 2018-12-06 ENCOUNTER — TELEPHONE (OUTPATIENT)
Dept: URGENT CARE | Facility: CLINIC | Age: 58
End: 2018-12-06

## 2018-12-06 DIAGNOSIS — N30.00 ACUTE CYSTITIS WITHOUT HEMATURIA: Primary | ICD-10-CM

## 2018-12-06 RX ORDER — NITROFURANTOIN 25; 75 MG/1; MG/1
100 CAPSULE ORAL 2 TIMES DAILY
Qty: 14 CAPSULE | Refills: 0 | Status: SHIPPED | OUTPATIENT
Start: 2018-12-06 | End: 2018-12-13

## 2018-12-06 NOTE — TELEPHONE ENCOUNTER
Spoke with patient about results of culture  Pt continues to have UTI signs and symptoms, states she feel the same as when she was examined  Verbalized understanding of taking the antibiotic, follow up with PCP in 3-5 days or if symptoms persist or worsen  Take antibiotic with food and a probiotic or active yogurt, continue to drink lots of fluids

## 2023-05-22 ENCOUNTER — OFFICE VISIT (OUTPATIENT)
Dept: FAMILY MEDICINE CLINIC | Facility: CLINIC | Age: 63
End: 2023-05-22

## 2023-05-22 VITALS
DIASTOLIC BLOOD PRESSURE: 80 MMHG | BODY MASS INDEX: 30.53 KG/M2 | TEMPERATURE: 99.4 F | WEIGHT: 190 LBS | RESPIRATION RATE: 16 BRPM | SYSTOLIC BLOOD PRESSURE: 120 MMHG | HEIGHT: 66 IN | HEART RATE: 68 BPM

## 2023-05-22 DIAGNOSIS — R42 VERTIGO: ICD-10-CM

## 2023-05-22 DIAGNOSIS — Z12.31 ENCOUNTER FOR SCREENING MAMMOGRAM FOR MALIGNANT NEOPLASM OF BREAST: ICD-10-CM

## 2023-05-22 DIAGNOSIS — Z12.11 SCREENING FOR COLON CANCER: ICD-10-CM

## 2023-05-22 DIAGNOSIS — M79.675 TOE PAIN, LEFT: Primary | ICD-10-CM

## 2023-05-22 DIAGNOSIS — M19.90 OSTEOARTHRITIS, UNSPECIFIED OSTEOARTHRITIS TYPE, UNSPECIFIED SITE: ICD-10-CM

## 2023-05-22 RX ORDER — MULTIVITAMIN
1 TABLET ORAL DAILY
COMMUNITY

## 2023-05-22 RX ORDER — OMEGA-3S/DHA/EPA/FISH OIL/D3 300MG-1000
400 CAPSULE ORAL DAILY
COMMUNITY

## 2023-05-22 RX ORDER — MELOXICAM 15 MG/1
15 TABLET ORAL DAILY
Qty: 30 TABLET | Refills: 6 | Status: SHIPPED | OUTPATIENT
Start: 2023-05-22

## 2023-05-22 RX ORDER — ASPIRIN 81 MG/1
81 TABLET, CHEWABLE ORAL DAILY
COMMUNITY

## 2023-05-22 NOTE — PROGRESS NOTES
"Chief Complaint   Patient presents with   • Establish Care   • Dizziness        Patient ID: Bennett Louise is a 57 y o  female  HPI  Pt is seeing to establish care -  Has chronic vertigo -  Has been to ENT and tried PT  - using meclizine PRN -  Does not do home exercises for BPV  - new issues L 2nd toe pain x 2 months -  Mostly in am  -  No injury reported -  Started to drink cherry juice with help  -  Has OA in both hands -  Asking for med to try on PRN basis     The following portions of the patient's history were reviewed and updated as appropriate: allergies, current medications, past family history, past medical history, past social history, past surgical history and problem list     Review of Systems   Constitutional: Negative  Respiratory: Negative  Cardiovascular: Negative  Gastrointestinal: Negative  Genitourinary: Negative  Musculoskeletal: Positive for arthralgias and joint swelling (multiple small joints both hands )  Negative for gait problem and myalgias  Skin: Negative  Neurological: Positive for dizziness  Negative for weakness, numbness and headaches  Current Outpatient Medications   Medication Sig Dispense Refill   • aspirin 81 mg chewable tablet Chew 81 mg daily     • cholecalciferol (VITAMIN D3) 400 units tablet Take 400 Units by mouth daily     • Multiple Vitamin (multivitamin) tablet Take 1 tablet by mouth daily     •         No current facility-administered medications for this visit  Objective:    /80 (BP Location: Left arm, Patient Position: Sitting, Cuff Size: Large)   Pulse 68   Temp 99 4 °F (37 4 °C)   Resp 16   Ht 5' 6\" (1 676 m)   Wt 86 2 kg (190 lb)   BMI 30 67 kg/m²        Physical Exam  Constitutional:       General: She is not in acute distress  Appearance: She is not ill-appearing     HENT:      Right Ear: Tympanic membrane and ear canal normal       Left Ear: Tympanic membrane and ear canal normal    Cardiovascular:      Rate and " Rhythm: Normal rate and regular rhythm  Heart sounds: No murmur heard  Pulmonary:      Effort: Pulmonary effort is normal  No respiratory distress  Breath sounds: No wheezing, rhonchi or rales  Musculoskeletal:      Right lower leg: No edema  Left lower leg: No edema  Left foot: Normal  No swelling or deformity  Comments: Normal L 2nd toe on exam    Neurological:      General: No focal deficit present  Mental Status: She is alert and oriented to person, place, and time  Psychiatric:         Mood and Affect: Mood normal          Thought Content: Thought content normal          Judgment: Judgment normal                  Assessment/Plan:         Diagnoses and all orders for this visit:    Toe pain, left  -     XR foot 3+ vw left; Future  -     Ambulatory referral to Podiatry; Future    Encounter for screening mammogram for malignant neoplasm of breast  -     Mammo screening bilateral w 3d & cad; Future    Screening for colon cancer  -     Ambulatory referral for colonoscopy; Future    Osteoarthritis, unspecified osteoarthritis type, unspecified site  -     meloxicam (Mobic) 15 mg tablet; Take 1 tablet (15 mg total) by mouth daily    Vertigo    Other orders  -     aspirin 81 mg chewable tablet; Chew 81 mg daily  -     Multiple Vitamin (multivitamin) tablet; Take 1 tablet by mouth daily  -     cholecalciferol (VITAMIN D3) 400 units tablet; Take 400 Units by mouth daily          BMI Counseling: Body mass index is 30 67 kg/m²  Discussed the patient's BMI with her  The BMI is above normal  Nutrition recommendations include reducing portion sizes, decreasing overall calorie intake, 3-5 servings of fruits/vegetables daily and reducing fast food intake  Exercise recommendations include exercising 3-5 times per week                   Judie Phelps MD

## 2023-05-23 ENCOUNTER — TELEPHONE (OUTPATIENT)
Dept: FAMILY MEDICINE CLINIC | Facility: CLINIC | Age: 63
End: 2023-05-23

## 2023-05-24 NOTE — TELEPHONE ENCOUNTER
Patient monroy snot want to schedule PE at this time, as she was just recently seen  I advised her that was just to establish as new patient  Patient says she will get lab order from her gyn at this time

## 2024-07-12 ENCOUNTER — OFFICE VISIT (OUTPATIENT)
Dept: URGENT CARE | Facility: CLINIC | Age: 64
End: 2024-07-12
Payer: COMMERCIAL

## 2024-07-12 VITALS
HEIGHT: 66 IN | DIASTOLIC BLOOD PRESSURE: 75 MMHG | RESPIRATION RATE: 18 BRPM | HEART RATE: 63 BPM | TEMPERATURE: 95.8 F | WEIGHT: 191 LBS | OXYGEN SATURATION: 97 % | SYSTOLIC BLOOD PRESSURE: 136 MMHG | BODY MASS INDEX: 30.7 KG/M2

## 2024-07-12 DIAGNOSIS — L23.7 ALLERGIC CONTACT DERMATITIS DUE TO PLANTS, EXCEPT FOOD: Primary | ICD-10-CM

## 2024-07-12 PROCEDURE — 99213 OFFICE O/P EST LOW 20 MIN: CPT | Performed by: FAMILY MEDICINE

## 2024-07-12 PROCEDURE — G2211 COMPLEX E/M VISIT ADD ON: HCPCS | Performed by: FAMILY MEDICINE

## 2024-07-12 RX ORDER — PREDNISONE 10 MG/1
10 TABLET ORAL DAILY
Qty: 5 TABLET | Refills: 0 | Status: SHIPPED | OUTPATIENT
Start: 2024-07-12 | End: 2024-07-17

## 2024-07-12 NOTE — PROGRESS NOTES
[facility]        NAME: Gabriela Naidu is a 63 y.o. female  : 1960    MRN: [id number]  DATE: 2024  TIME: 10:33 AM    Assessment and Plan   Allergic contact dermatitis due to plants, except food [L23.7]  1. Allergic contact dermatitis due to plants, except food  predniSONE 10 mg tablet    diphenhydrAMINE (BENADRYL) 2 % cream        Likely experiencing a contact dermatitis of the right arm.  Will treat with a low-dose oral steroid for the next 5 days and she may apply topical Benadryl as needed for itching.    Patient Instructions     Follow up with PCP in 3-5 days.  Proceed to  ER if symptoms worsen.    If tests have been performed at [facility], our office will contact you with results if changes need to be made to the care plan discussed with you at the visit.  You can review your full results on [facility].    Chief Complaint     Chief Complaint   Patient presents with    Rash     For a couple weeks, last night noticed insect bite         History of Present Illness       62 yo F presents with pruritic rash on the right arm which has persisted for about 2 weeks.     Rash  Pertinent negatives include no fever or shortness of breath.       Review of Systems   Review of Systems   Constitutional:  Negative for chills and fever.   Respiratory:  Negative for shortness of breath.    Cardiovascular:  Negative for chest pain.   Gastrointestinal:  Negative for abdominal pain.   Skin:  Positive for rash.   Neurological:  Negative for dizziness and headaches.     Current Medications       Current Outpatient Medications:     aspirin 81 mg chewable tablet, Chew 81 mg daily, Disp: , Rfl:     cholecalciferol (VITAMIN D3) 400 units tablet, Take 400 Units by mouth daily, Disp: , Rfl:     diphenhydrAMINE (BENADRYL) 2 % cream, Apply topically 3 (three) times a day as needed for itching or allergies, Disp: 15 g, Rfl: 0    Multiple Vitamin (multivitamin) tablet, Take 1 tablet by mouth daily, Disp: ,  "Rfl:     predniSONE 10 mg tablet, Take 1 tablet (10 mg total) by mouth daily for 5 days, Disp: 5 tablet, Rfl: 0    meloxicam (Mobic) 15 mg tablet, Take 1 tablet (15 mg total) by mouth daily (Patient not taking: Reported on 7/12/2024), Disp: 30 tablet, Rfl: 6    Current Allergies     Allergies as of 07/12/2024 - Reviewed 07/12/2024   Allergen Reaction Noted    Augmentin [amoxicillin-pot clavulanate] GI Intolerance 10/23/2017    Penicillins Rash 10/23/2017            The following portions of the patient's history were reviewed and updated as appropriate: allergies, current medications, past family history, past medical history, past social history, past surgical history and problem list.     Past Medical History:   Diagnosis Date    Neck pain     Varicose vein of leg     Vertigo        Past Surgical History:   Procedure Laterality Date    CHOLECYSTECTOMY      TONSILLECTOMY      VEIN SURGERY      vein stripping       Family History   Problem Relation Age of Onset    Heart attack Mother     Transient ischemic attack Mother     Diabetes Father     Breast cancer Sister     No Known Problems Son     No Known Problems Daughter          Medications have been verified.        Objective   /75   Pulse 63   Temp (!) 95.8 °F (35.4 °C)   Resp 18   Ht 5' 6\" (1.676 m)   Wt 86.6 kg (191 lb)   SpO2 97%   BMI 30.83 kg/m²   No LMP recorded. Patient is postmenopausal.       Physical Exam     Physical Exam  Vitals and nursing note reviewed.   Constitutional:       General: She is in acute distress.      Appearance: Normal appearance. She is normal weight. She is not ill-appearing, toxic-appearing or diaphoretic.   HENT:      Head: Normocephalic and atraumatic.   Eyes:      General:         Right eye: No discharge.         Left eye: No discharge.      Conjunctiva/sclera: Conjunctivae normal.   Pulmonary:      Effort: Pulmonary effort is normal.   Skin:     General: Skin is warm.      Findings: Rash present. No erythema. "   Neurological:      General: No focal deficit present.      Mental Status: She is alert and oriented to person, place, and time.   Psychiatric:         Mood and Affect: Mood normal.         Behavior: Behavior normal.         Thought Content: Thought content normal.         Judgment: Judgment normal.